# Patient Record
Sex: MALE | Race: WHITE
[De-identification: names, ages, dates, MRNs, and addresses within clinical notes are randomized per-mention and may not be internally consistent; named-entity substitution may affect disease eponyms.]

---

## 2018-11-15 ENCOUNTER — HOSPITAL ENCOUNTER (EMERGENCY)
Dept: HOSPITAL 26 - MED | Age: 53
Discharge: HOME | End: 2018-11-15
Payer: COMMERCIAL

## 2018-11-15 VITALS — DIASTOLIC BLOOD PRESSURE: 70 MMHG | SYSTOLIC BLOOD PRESSURE: 120 MMHG

## 2018-11-15 VITALS
BODY MASS INDEX: 29.12 KG/M2 | DIASTOLIC BLOOD PRESSURE: 82 MMHG | WEIGHT: 215 LBS | SYSTOLIC BLOOD PRESSURE: 115 MMHG | HEIGHT: 72 IN

## 2018-11-15 DIAGNOSIS — F41.9: ICD-10-CM
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
## 2018-11-15 DIAGNOSIS — S22.42XA: Primary | ICD-10-CM
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
## 2018-11-15 DIAGNOSIS — I10: ICD-10-CM

## 2018-11-15 LAB
ALBUMIN FLD-MCNC: 3.7 G/DL (ref 3.4–5)
ANION GAP SERPL CALCULATED.3IONS-SCNC: 18.9 MMOL/L (ref 8–16)
AST SERPL-CCNC: 287 U/L (ref 15–37)
BASOPHILS # BLD AUTO: 0.1 K/UL (ref 0–0.22)
BASOPHILS NFR BLD AUTO: 2.2 % (ref 0–2)
BILIRUB SERPL-MCNC: 0.7 MG/DL (ref 0–1)
BUN SERPL-MCNC: 7 MG/DL (ref 7–18)
CHLORIDE SERPL-SCNC: 105 MMOL/L (ref 98–107)
CO2 SERPL-SCNC: 21 MMOL/L (ref 21–32)
CREAT SERPL-MCNC: 1 MG/DL (ref 0.7–1.3)
EOSINOPHIL # BLD AUTO: 0.1 K/UL (ref 0–0.4)
EOSINOPHIL NFR BLD AUTO: 1.1 % (ref 0–4)
ERYTHROCYTE [DISTWIDTH] IN BLOOD BY AUTOMATED COUNT: 17.9 % (ref 11.6–13.7)
GFR SERPL CREATININE-BSD FRML MDRD: 101 ML/MIN (ref 90–?)
GLUCOSE SERPL-MCNC: 109 MG/DL (ref 74–106)
HCT VFR BLD AUTO: 47.3 % (ref 36–52)
HGB BLD-MCNC: 16.1 G/DL (ref 12–18)
LYMPHOCYTES # BLD AUTO: 1.5 K/UL (ref 2–11.5)
LYMPHOCYTES NFR BLD AUTO: 29.3 % (ref 20.5–51.1)
MCH RBC QN AUTO: 31 PG (ref 27–31)
MCHC RBC AUTO-ENTMCNC: 34 G/DL (ref 33–37)
MCV RBC AUTO: 91.7 FL (ref 80–94)
MONOCYTES # BLD AUTO: 0.8 K/UL (ref 0.8–1)
MONOCYTES NFR BLD AUTO: 15.8 % (ref 1.7–9.3)
NEUTROPHILS # BLD AUTO: 2.6 K/UL (ref 1.8–7.7)
NEUTROPHILS NFR BLD AUTO: 51.6 % (ref 42.2–75.2)
PLATELET # BLD AUTO: 233 K/UL (ref 140–450)
POTASSIUM SERPL-SCNC: 3.9 MMOL/L (ref 3.5–5.1)
PROTHROMBIN TIME: 10.1 SECS (ref 10.8–13.4)
RBC # BLD AUTO: 5.16 MIL/UL (ref 4.2–6.1)
SODIUM SERPL-SCNC: 141 MMOL/L (ref 136–145)
WBC # BLD AUTO: 5 K/UL (ref 4.8–10.8)

## 2018-11-15 PROCEDURE — 70450 CT HEAD/BRAIN W/O DYE: CPT

## 2018-11-15 PROCEDURE — 80053 COMPREHEN METABOLIC PANEL: CPT

## 2018-11-15 PROCEDURE — 84484 ASSAY OF TROPONIN QUANT: CPT

## 2018-11-15 PROCEDURE — 96361 HYDRATE IV INFUSION ADD-ON: CPT

## 2018-11-15 PROCEDURE — 85610 PROTHROMBIN TIME: CPT

## 2018-11-15 PROCEDURE — 85025 COMPLETE CBC W/AUTO DIFF WBC: CPT

## 2018-11-15 PROCEDURE — 99285 EMERGENCY DEPT VISIT HI MDM: CPT

## 2018-11-15 PROCEDURE — 85730 THROMBOPLASTIN TIME PARTIAL: CPT

## 2018-11-15 PROCEDURE — 96360 HYDRATION IV INFUSION INIT: CPT

## 2018-11-15 PROCEDURE — 71045 X-RAY EXAM CHEST 1 VIEW: CPT

## 2018-11-15 PROCEDURE — 36415 COLL VENOUS BLD VENIPUNCTURE: CPT

## 2019-10-22 ENCOUNTER — HOSPITAL ENCOUNTER (EMERGENCY)
Dept: HOSPITAL 26 - MED | Age: 54
Discharge: NURSING, REHAB OR LONG-TERM CARE | End: 2019-10-22
Payer: MEDICAID

## 2019-10-22 VITALS — DIASTOLIC BLOOD PRESSURE: 72 MMHG | SYSTOLIC BLOOD PRESSURE: 111 MMHG

## 2019-10-22 VITALS — DIASTOLIC BLOOD PRESSURE: 69 MMHG | SYSTOLIC BLOOD PRESSURE: 110 MMHG

## 2019-10-22 VITALS — HEIGHT: 72 IN | BODY MASS INDEX: 42.66 KG/M2 | WEIGHT: 315 LBS

## 2019-10-22 DIAGNOSIS — E11.22: ICD-10-CM

## 2019-10-22 DIAGNOSIS — Z79.899: ICD-10-CM

## 2019-10-22 DIAGNOSIS — K74.60: Primary | ICD-10-CM

## 2019-10-22 DIAGNOSIS — N18.6: ICD-10-CM

## 2019-10-22 DIAGNOSIS — I12.0: ICD-10-CM

## 2019-10-22 DIAGNOSIS — Z99.2: ICD-10-CM

## 2019-10-22 DIAGNOSIS — R18.8: ICD-10-CM

## 2019-10-22 DIAGNOSIS — Z88.8: ICD-10-CM

## 2019-10-22 DIAGNOSIS — D64.9: ICD-10-CM

## 2019-10-22 DIAGNOSIS — Z98.890: ICD-10-CM

## 2019-10-22 DIAGNOSIS — Z79.4: ICD-10-CM

## 2019-10-22 LAB
ALBUMIN FLD-MCNC: 2.3 G/DL (ref 3.4–5)
ANION GAP SERPL CALCULATED.3IONS-SCNC: 14 MMOL/L (ref 8–16)
AST SERPL-CCNC: 54 U/L (ref 15–37)
BASOPHILS # BLD AUTO: 0.1 K/UL (ref 0–0.22)
BASOPHILS NFR BLD AUTO: 1 % (ref 0–2)
BILIRUB SERPL-MCNC: 1.3 MG/DL (ref 0–1)
BUN SERPL-MCNC: 36 MG/DL (ref 7–18)
CHLORIDE SERPL-SCNC: 100 MMOL/L (ref 98–107)
CO2 SERPL-SCNC: 25 MMOL/L (ref 21–32)
CREAT SERPL-MCNC: 3.7 MG/DL (ref 0.7–1.3)
EOSINOPHIL # BLD AUTO: 0 K/UL (ref 0–0.4)
EOSINOPHIL NFR BLD AUTO: 0.3 % (ref 0–4)
ERYTHROCYTE [DISTWIDTH] IN BLOOD BY AUTOMATED COUNT: 17.4 % (ref 11.6–13.7)
GFR SERPL CREATININE-BSD FRML MDRD: 22 ML/MIN (ref 90–?)
GLUCOSE SERPL-MCNC: 202 MG/DL (ref 74–106)
HCT VFR BLD AUTO: 31.7 % (ref 36–52)
HGB BLD-MCNC: 10.2 G/DL (ref 12–18)
LYMPHOCYTES # BLD AUTO: 2.4 K/UL (ref 2–11.5)
LYMPHOCYTES NFR BLD AUTO: 19.7 % (ref 20.5–51.1)
MCH RBC QN AUTO: 30 PG (ref 27–31)
MCHC RBC AUTO-ENTMCNC: 32 G/DL (ref 33–37)
MCV RBC AUTO: 94.1 FL (ref 80–94)
MONOCYTES # BLD AUTO: 0.7 K/UL (ref 0.8–1)
MONOCYTES NFR BLD AUTO: 5.9 % (ref 1.7–9.3)
NEUTROPHILS # BLD AUTO: 8.9 K/UL (ref 1.8–7.7)
NEUTROPHILS NFR BLD AUTO: 73.1 % (ref 42.2–75.2)
PLATELET # BLD AUTO: 99 K/UL (ref 140–450)
POTASSIUM SERPL-SCNC: 3 MMOL/L (ref 3.5–5.1)
PROTHROMBIN TIME: 13.3 SECS (ref 10.8–13.4)
RBC # BLD AUTO: 3.36 MIL/UL (ref 4.2–6.1)
SODIUM SERPL-SCNC: 136 MMOL/L (ref 136–145)
WBC # BLD AUTO: 12.1 K/UL (ref 4.8–10.8)

## 2019-10-22 PROCEDURE — 76705 ECHO EXAM OF ABDOMEN: CPT

## 2019-10-22 PROCEDURE — 80053 COMPREHEN METABOLIC PANEL: CPT

## 2019-10-22 PROCEDURE — 93005 ELECTROCARDIOGRAM TRACING: CPT

## 2019-10-22 PROCEDURE — 85025 COMPLETE CBC W/AUTO DIFF WBC: CPT

## 2019-10-22 PROCEDURE — 99284 EMERGENCY DEPT VISIT MOD MDM: CPT

## 2019-10-22 PROCEDURE — 71045 X-RAY EXAM CHEST 1 VIEW: CPT

## 2019-10-22 PROCEDURE — 85610 PROTHROMBIN TIME: CPT

## 2019-10-22 PROCEDURE — 83880 ASSAY OF NATRIURETIC PEPTIDE: CPT

## 2019-10-22 PROCEDURE — 85730 THROMBOPLASTIN TIME PARTIAL: CPT

## 2019-10-22 PROCEDURE — 84484 ASSAY OF TROPONIN QUANT: CPT

## 2019-10-22 PROCEDURE — 36415 COLL VENOUS BLD VENIPUNCTURE: CPT

## 2019-10-22 NOTE — NUR
called [facility] [phone], report given to Grecia zuniga. notified pt will transfer 
back to [facility] IN 30-40 MINUTES.

## 2019-10-22 NOTE — NUR
BROUGHT IN BY EMS FROM [facility] EXTENDED CARE

PT'S PCP REQUESTED PT TO BE BROUGHT TO ER FOR PARACENTESIS

COMPLETED DIALYSIS YESTERDAY 3-4 HRS ( SHUNT TO RIGHT UPPER CHEST; M W F)



PEDAL EDEMA, INCREASED ABD GIRTH---FULL CLEAR SPEECH; NO COUGH AT THIS TIME



HX---ESRD (HEMODIALYSIS), CIRRHOSIS, THYROID, DM, . DENIES N/V/D; SKIN IS 
PINK/WARM/DRY; AAOX4 .LUNGS CLEAR BL; HR EVEN AND REGULAR; PT DENIES ANY FEVER, 
CP, SOB, OR COUGH AT THIS TIME; PATIENT STATES PAIN OF 0/10 AT THIS TIME;  
PATIENT POSITIONED FOR COMFORT; HOB ELEVATED; BEDRAILS UP X2; BED DOWN. ER MD 
MADE AWARE OF PT STATUS.

## 2019-12-18 ENCOUNTER — HOSPITAL ENCOUNTER (INPATIENT)
Dept: HOSPITAL 26 - MED | Age: 54
LOS: 6 days | Discharge: NURSING, REHAB OR LONG-TERM CARE | DRG: 720 | End: 2019-12-24
Attending: GENERAL PRACTICE | Admitting: GENERAL PRACTICE
Payer: MEDICAID

## 2019-12-18 VITALS — BODY MASS INDEX: 36.57 KG/M2 | WEIGHT: 270 LBS | HEIGHT: 72 IN

## 2019-12-18 VITALS — SYSTOLIC BLOOD PRESSURE: 73 MMHG | DIASTOLIC BLOOD PRESSURE: 35 MMHG

## 2019-12-18 DIAGNOSIS — N18.6: ICD-10-CM

## 2019-12-18 DIAGNOSIS — Z91.19: ICD-10-CM

## 2019-12-18 DIAGNOSIS — N17.0: ICD-10-CM

## 2019-12-18 DIAGNOSIS — D68.9: ICD-10-CM

## 2019-12-18 DIAGNOSIS — D69.6: ICD-10-CM

## 2019-12-18 DIAGNOSIS — A41.9: Primary | ICD-10-CM

## 2019-12-18 DIAGNOSIS — K70.31: ICD-10-CM

## 2019-12-18 DIAGNOSIS — Z88.8: ICD-10-CM

## 2019-12-18 DIAGNOSIS — F31.9: ICD-10-CM

## 2019-12-18 DIAGNOSIS — N20.0: ICD-10-CM

## 2019-12-18 DIAGNOSIS — N39.0: ICD-10-CM

## 2019-12-18 DIAGNOSIS — Z99.2: ICD-10-CM

## 2019-12-18 DIAGNOSIS — E83.42: ICD-10-CM

## 2019-12-18 DIAGNOSIS — J96.20: ICD-10-CM

## 2019-12-18 DIAGNOSIS — I48.0: ICD-10-CM

## 2019-12-18 DIAGNOSIS — E03.9: ICD-10-CM

## 2019-12-18 DIAGNOSIS — D63.8: ICD-10-CM

## 2019-12-18 DIAGNOSIS — K72.90: ICD-10-CM

## 2019-12-18 DIAGNOSIS — D64.9: ICD-10-CM

## 2019-12-18 DIAGNOSIS — D69.59: ICD-10-CM

## 2019-12-18 DIAGNOSIS — K52.9: ICD-10-CM

## 2019-12-18 DIAGNOSIS — E87.6: ICD-10-CM

## 2019-12-18 DIAGNOSIS — E43: ICD-10-CM

## 2019-12-18 DIAGNOSIS — E83.39: ICD-10-CM

## 2019-12-18 DIAGNOSIS — E11.22: ICD-10-CM

## 2019-12-18 DIAGNOSIS — R65.21: ICD-10-CM

## 2019-12-18 DIAGNOSIS — E66.9: ICD-10-CM

## 2019-12-18 DIAGNOSIS — F10.10: ICD-10-CM

## 2019-12-18 LAB
ALBUMIN FLD-MCNC: 2 G/DL (ref 3.4–5)
ANION GAP SERPL CALCULATED.3IONS-SCNC: 14.6 MMOL/L (ref 8–16)
AST SERPL-CCNC: 72 U/L (ref 15–37)
BASOPHILS # BLD AUTO: 0.6 K/UL (ref 0–0.22)
BASOPHILS NFR BLD AUTO: 3.5 % (ref 0–2)
BILIRUB SERPL-MCNC: 2.9 MG/DL (ref 0–1)
BUN SERPL-MCNC: 9 MG/DL (ref 7–18)
CHLORIDE SERPL-SCNC: 102 MMOL/L (ref 98–107)
CO2 SERPL-SCNC: 25.2 MMOL/L (ref 21–32)
CREAT SERPL-MCNC: 3.3 MG/DL (ref 0.7–1.3)
EOSINOPHIL # BLD AUTO: 0.3 K/UL (ref 0–0.4)
EOSINOPHIL NFR BLD AUTO: 1.5 % (ref 0–4)
ERYTHROCYTE [DISTWIDTH] IN BLOOD BY AUTOMATED COUNT: 19.5 % (ref 11.6–13.7)
GFR SERPL CREATININE-BSD FRML MDRD: 25 ML/MIN (ref 90–?)
GLUCOSE SERPL-MCNC: 88 MG/DL (ref 74–106)
HCT VFR BLD AUTO: 33.9 % (ref 36–52)
HGB BLD-MCNC: 10.7 G/DL (ref 12–18)
LYMPHOCYTES # BLD AUTO: 2 K/UL (ref 2–11.5)
LYMPHOCYTES NFR BLD AUTO: 11.8 % (ref 20.5–51.1)
MCH RBC QN AUTO: 29 PG (ref 27–31)
MCHC RBC AUTO-ENTMCNC: 32 G/DL (ref 33–37)
MCV RBC AUTO: 91.2 FL (ref 80–94)
MONOCYTES # BLD AUTO: 2.1 K/UL (ref 0.8–1)
MONOCYTES NFR BLD AUTO: 12.7 % (ref 1.7–9.3)
NEUTROPHILS # BLD AUTO: 11.9 K/UL (ref 1.8–7.7)
NEUTROPHILS NFR BLD AUTO: 70.5 % (ref 42.2–75.2)
PLATELET # BLD AUTO: 77 K/UL (ref 140–450)
POTASSIUM SERPL-SCNC: 3.8 MMOL/L (ref 3.5–5.1)
PROTHROMBIN TIME: 21.3 SECS (ref 10.8–13.4)
RBC # BLD AUTO: 3.72 MIL/UL (ref 4.2–6.1)
SODIUM SERPL-SCNC: 138 MMOL/L (ref 136–145)
WBC # BLD AUTO: 16.9 K/UL (ref 4.8–10.8)

## 2019-12-18 PROCEDURE — P9046 ALBUMIN (HUMAN), 25%, 20 ML: HCPCS

## 2019-12-18 NOTE — NUR
PER DR CALABRESE, VERBAL ORDER TO OPEN WIDE OPEN 1L NS OUT OF ORDERED 
MAINTENANCE FLUIDS (3.5L AT 125ML/HR), ORDER CARRIED OUT.

## 2019-12-18 NOTE — NUR
54 YEAR OLD MALE BIBA COMPLAINS OF ABDOMINAL PAIN 6/10 THAT IS ACHING AND 
STABBING FOR THE PAST WEEK. ABDOMEN BOWEL SOUNDS ACTIVE X4, TENDER ON 
PALPATION. PATIENT STATES DIALYSIS 5X WEEK AND UNABLE TO URINATE. PATIENT 
STATES THAT AT END OF DIALYSIS AT 1700 THEY SPED UP THE TREATMENT AND HE HAD 
SHORTNESS OF BREATHE AND CHEST PAIN. SHORTNESS OF BREATHE AND CHEST PAIN DENIED 
CURRENTLY. , BP 73/35. PATIENT ALERT AND ORIENTED, BREATHING EVEN AND 
NONLABORED, SKIN COOL AND DRY. BED IN LOWEST POSITION, LOCKED, BED RAIL UPX1.

## 2019-12-18 NOTE — NUR
PT ATTEMPTED TO COLLECT URINE, UNABLE TO AT THIS TIME. PT REFUSING TO HAVE 
URINE CATHETER PLACED AT THIS TIME, DR CALABRESE MADE AWARE.

## 2019-12-19 VITALS — SYSTOLIC BLOOD PRESSURE: 101 MMHG | DIASTOLIC BLOOD PRESSURE: 43 MMHG

## 2019-12-19 VITALS — DIASTOLIC BLOOD PRESSURE: 57 MMHG | SYSTOLIC BLOOD PRESSURE: 90 MMHG

## 2019-12-19 VITALS — SYSTOLIC BLOOD PRESSURE: 87 MMHG | DIASTOLIC BLOOD PRESSURE: 46 MMHG

## 2019-12-19 VITALS — SYSTOLIC BLOOD PRESSURE: 98 MMHG | DIASTOLIC BLOOD PRESSURE: 55 MMHG

## 2019-12-19 VITALS — DIASTOLIC BLOOD PRESSURE: 59 MMHG | SYSTOLIC BLOOD PRESSURE: 90 MMHG

## 2019-12-19 VITALS — SYSTOLIC BLOOD PRESSURE: 99 MMHG | DIASTOLIC BLOOD PRESSURE: 63 MMHG

## 2019-12-19 VITALS — SYSTOLIC BLOOD PRESSURE: 98 MMHG | DIASTOLIC BLOOD PRESSURE: 63 MMHG

## 2019-12-19 VITALS — DIASTOLIC BLOOD PRESSURE: 59 MMHG | SYSTOLIC BLOOD PRESSURE: 96 MMHG

## 2019-12-19 VITALS — DIASTOLIC BLOOD PRESSURE: 48 MMHG | SYSTOLIC BLOOD PRESSURE: 104 MMHG

## 2019-12-19 VITALS — SYSTOLIC BLOOD PRESSURE: 92 MMHG | DIASTOLIC BLOOD PRESSURE: 45 MMHG

## 2019-12-19 VITALS — DIASTOLIC BLOOD PRESSURE: 50 MMHG | SYSTOLIC BLOOD PRESSURE: 101 MMHG

## 2019-12-19 VITALS — DIASTOLIC BLOOD PRESSURE: 60 MMHG | SYSTOLIC BLOOD PRESSURE: 114 MMHG

## 2019-12-19 VITALS — DIASTOLIC BLOOD PRESSURE: 52 MMHG | SYSTOLIC BLOOD PRESSURE: 93 MMHG

## 2019-12-19 VITALS — DIASTOLIC BLOOD PRESSURE: 51 MMHG | SYSTOLIC BLOOD PRESSURE: 87 MMHG

## 2019-12-19 VITALS — SYSTOLIC BLOOD PRESSURE: 90 MMHG | DIASTOLIC BLOOD PRESSURE: 61 MMHG

## 2019-12-19 VITALS — SYSTOLIC BLOOD PRESSURE: 101 MMHG | DIASTOLIC BLOOD PRESSURE: 66 MMHG

## 2019-12-19 VITALS — DIASTOLIC BLOOD PRESSURE: 52 MMHG | SYSTOLIC BLOOD PRESSURE: 91 MMHG

## 2019-12-19 VITALS — SYSTOLIC BLOOD PRESSURE: 98 MMHG | DIASTOLIC BLOOD PRESSURE: 53 MMHG

## 2019-12-19 VITALS — SYSTOLIC BLOOD PRESSURE: 88 MMHG | DIASTOLIC BLOOD PRESSURE: 52 MMHG

## 2019-12-19 VITALS — SYSTOLIC BLOOD PRESSURE: 90 MMHG | DIASTOLIC BLOOD PRESSURE: 60 MMHG

## 2019-12-19 VITALS — SYSTOLIC BLOOD PRESSURE: 91 MMHG | DIASTOLIC BLOOD PRESSURE: 51 MMHG

## 2019-12-19 VITALS — DIASTOLIC BLOOD PRESSURE: 41 MMHG | SYSTOLIC BLOOD PRESSURE: 95 MMHG

## 2019-12-19 VITALS — SYSTOLIC BLOOD PRESSURE: 99 MMHG | DIASTOLIC BLOOD PRESSURE: 64 MMHG

## 2019-12-19 VITALS — DIASTOLIC BLOOD PRESSURE: 66 MMHG | SYSTOLIC BLOOD PRESSURE: 92 MMHG

## 2019-12-19 VITALS — SYSTOLIC BLOOD PRESSURE: 104 MMHG | DIASTOLIC BLOOD PRESSURE: 63 MMHG

## 2019-12-19 VITALS — DIASTOLIC BLOOD PRESSURE: 57 MMHG | SYSTOLIC BLOOD PRESSURE: 98 MMHG

## 2019-12-19 VITALS — DIASTOLIC BLOOD PRESSURE: 61 MMHG | SYSTOLIC BLOOD PRESSURE: 92 MMHG

## 2019-12-19 VITALS — DIASTOLIC BLOOD PRESSURE: 46 MMHG | SYSTOLIC BLOOD PRESSURE: 87 MMHG

## 2019-12-19 VITALS — DIASTOLIC BLOOD PRESSURE: 63 MMHG | SYSTOLIC BLOOD PRESSURE: 98 MMHG

## 2019-12-19 VITALS — SYSTOLIC BLOOD PRESSURE: 93 MMHG | DIASTOLIC BLOOD PRESSURE: 52 MMHG

## 2019-12-19 VITALS — SYSTOLIC BLOOD PRESSURE: 110 MMHG | DIASTOLIC BLOOD PRESSURE: 74 MMHG

## 2019-12-19 VITALS — DIASTOLIC BLOOD PRESSURE: 56 MMHG | SYSTOLIC BLOOD PRESSURE: 100 MMHG

## 2019-12-19 VITALS — DIASTOLIC BLOOD PRESSURE: 74 MMHG | SYSTOLIC BLOOD PRESSURE: 102 MMHG

## 2019-12-19 VITALS — DIASTOLIC BLOOD PRESSURE: 56 MMHG | SYSTOLIC BLOOD PRESSURE: 87 MMHG

## 2019-12-19 VITALS — DIASTOLIC BLOOD PRESSURE: 55 MMHG | SYSTOLIC BLOOD PRESSURE: 90 MMHG

## 2019-12-19 VITALS — SYSTOLIC BLOOD PRESSURE: 100 MMHG | DIASTOLIC BLOOD PRESSURE: 65 MMHG

## 2019-12-19 VITALS — SYSTOLIC BLOOD PRESSURE: 97 MMHG | DIASTOLIC BLOOD PRESSURE: 58 MMHG

## 2019-12-19 VITALS — DIASTOLIC BLOOD PRESSURE: 52 MMHG | SYSTOLIC BLOOD PRESSURE: 100 MMHG

## 2019-12-19 VITALS — DIASTOLIC BLOOD PRESSURE: 60 MMHG | SYSTOLIC BLOOD PRESSURE: 97 MMHG

## 2019-12-19 VITALS — SYSTOLIC BLOOD PRESSURE: 102 MMHG | DIASTOLIC BLOOD PRESSURE: 60 MMHG

## 2019-12-19 VITALS — DIASTOLIC BLOOD PRESSURE: 59 MMHG | SYSTOLIC BLOOD PRESSURE: 83 MMHG

## 2019-12-19 VITALS — DIASTOLIC BLOOD PRESSURE: 51 MMHG | SYSTOLIC BLOOD PRESSURE: 92 MMHG

## 2019-12-19 VITALS — DIASTOLIC BLOOD PRESSURE: 61 MMHG | SYSTOLIC BLOOD PRESSURE: 90 MMHG

## 2019-12-19 VITALS — DIASTOLIC BLOOD PRESSURE: 45 MMHG | SYSTOLIC BLOOD PRESSURE: 92 MMHG

## 2019-12-19 VITALS — SYSTOLIC BLOOD PRESSURE: 89 MMHG | DIASTOLIC BLOOD PRESSURE: 56 MMHG

## 2019-12-19 VITALS — SYSTOLIC BLOOD PRESSURE: 86 MMHG | DIASTOLIC BLOOD PRESSURE: 55 MMHG

## 2019-12-19 VITALS — SYSTOLIC BLOOD PRESSURE: 111 MMHG | DIASTOLIC BLOOD PRESSURE: 60 MMHG

## 2019-12-19 VITALS — SYSTOLIC BLOOD PRESSURE: 78 MMHG | DIASTOLIC BLOOD PRESSURE: 48 MMHG

## 2019-12-19 VITALS — SYSTOLIC BLOOD PRESSURE: 91 MMHG | DIASTOLIC BLOOD PRESSURE: 52 MMHG

## 2019-12-19 VITALS — SYSTOLIC BLOOD PRESSURE: 85 MMHG | DIASTOLIC BLOOD PRESSURE: 51 MMHG

## 2019-12-19 VITALS — DIASTOLIC BLOOD PRESSURE: 52 MMHG | SYSTOLIC BLOOD PRESSURE: 96 MMHG

## 2019-12-19 VITALS — SYSTOLIC BLOOD PRESSURE: 111 MMHG | DIASTOLIC BLOOD PRESSURE: 69 MMHG

## 2019-12-19 VITALS — SYSTOLIC BLOOD PRESSURE: 98 MMHG | DIASTOLIC BLOOD PRESSURE: 59 MMHG

## 2019-12-19 VITALS — SYSTOLIC BLOOD PRESSURE: 111 MMHG | DIASTOLIC BLOOD PRESSURE: 54 MMHG

## 2019-12-19 VITALS — DIASTOLIC BLOOD PRESSURE: 58 MMHG | SYSTOLIC BLOOD PRESSURE: 97 MMHG

## 2019-12-19 VITALS — SYSTOLIC BLOOD PRESSURE: 120 MMHG | DIASTOLIC BLOOD PRESSURE: 25 MMHG

## 2019-12-19 VITALS — DIASTOLIC BLOOD PRESSURE: 58 MMHG | SYSTOLIC BLOOD PRESSURE: 95 MMHG

## 2019-12-19 VITALS — SYSTOLIC BLOOD PRESSURE: 81 MMHG | DIASTOLIC BLOOD PRESSURE: 55 MMHG

## 2019-12-19 VITALS — DIASTOLIC BLOOD PRESSURE: 60 MMHG | SYSTOLIC BLOOD PRESSURE: 90 MMHG

## 2019-12-19 VITALS — SYSTOLIC BLOOD PRESSURE: 108 MMHG | DIASTOLIC BLOOD PRESSURE: 60 MMHG

## 2019-12-19 VITALS — DIASTOLIC BLOOD PRESSURE: 33 MMHG | SYSTOLIC BLOOD PRESSURE: 92 MMHG

## 2019-12-19 VITALS — DIASTOLIC BLOOD PRESSURE: 65 MMHG | SYSTOLIC BLOOD PRESSURE: 111 MMHG

## 2019-12-19 VITALS — DIASTOLIC BLOOD PRESSURE: 56 MMHG | SYSTOLIC BLOOD PRESSURE: 88 MMHG

## 2019-12-19 VITALS — DIASTOLIC BLOOD PRESSURE: 47 MMHG | SYSTOLIC BLOOD PRESSURE: 90 MMHG

## 2019-12-19 VITALS — SYSTOLIC BLOOD PRESSURE: 112 MMHG | DIASTOLIC BLOOD PRESSURE: 45 MMHG

## 2019-12-19 LAB
AMYLASE SERPL-CCNC: 43 U/L (ref 25–115)
ANION GAP SERPL CALCULATED.3IONS-SCNC: 15.9 MMOL/L (ref 8–16)
BASOPHILS # BLD AUTO: 0.1 K/UL (ref 0–0.22)
BASOPHILS NFR BLD AUTO: 0.9 % (ref 0–2)
BUN SERPL-MCNC: 11 MG/DL (ref 7–18)
CHLORIDE SERPL-SCNC: 103 MMOL/L (ref 98–107)
CHOLEST/HDLC SERPL: 6.9 {RATIO} (ref 1–4.5)
CO2 SERPL-SCNC: 23.9 MMOL/L (ref 21–32)
CREAT SERPL-MCNC: 3.4 MG/DL (ref 0.7–1.3)
EOSINOPHIL # BLD AUTO: 0.1 K/UL (ref 0–0.4)
EOSINOPHIL NFR BLD AUTO: 0.4 % (ref 0–4)
ERYTHROCYTE [DISTWIDTH] IN BLOOD BY AUTOMATED COUNT: 19.4 % (ref 11.6–13.7)
GFR SERPL CREATININE-BSD FRML MDRD: 24 ML/MIN (ref 90–?)
GLUCOSE SERPL-MCNC: 115 MG/DL (ref 74–106)
HCT VFR BLD AUTO: 28.7 % (ref 36–52)
HDLC SERPL-MCNC: 16 MG/DL (ref 40–60)
HGB BLD-MCNC: 9 G/DL (ref 12–18)
LDLC SERPL CALC-MCNC: 83 MG/DL (ref 60–100)
LYMPHOCYTES # BLD AUTO: 1.4 K/UL (ref 2–11.5)
LYMPHOCYTES NFR BLD AUTO: 9 % (ref 20.5–51.1)
MAGNESIUM SERPL-MCNC: 1.5 MG/DL (ref 1.8–2.4)
MAGNESIUM SERPL-MCNC: 1.7 MG/DL (ref 1.8–2.4)
MCH RBC QN AUTO: 29 PG (ref 27–31)
MCHC RBC AUTO-ENTMCNC: 32 G/DL (ref 33–37)
MCV RBC AUTO: 91.9 FL (ref 80–94)
MONOCYTES # BLD AUTO: 1 K/UL (ref 0.8–1)
MONOCYTES NFR BLD AUTO: 6.3 % (ref 1.7–9.3)
NEUTROPHILS # BLD AUTO: 13.2 K/UL (ref 1.8–7.7)
NEUTROPHILS NFR BLD AUTO: 83.4 % (ref 42.2–75.2)
PHOSPHATE SERPL-MCNC: 2.4 MG/DL (ref 2.5–4.9)
PHOSPHATE SERPL-MCNC: 2.8 MG/DL (ref 2.5–4.9)
PLATELET # BLD AUTO: 82 K/UL (ref 140–450)
POTASSIUM SERPL-SCNC: 3.8 MMOL/L (ref 3.5–5.1)
RBC # BLD AUTO: 3.12 MIL/UL (ref 4.2–6.1)
SODIUM SERPL-SCNC: 139 MMOL/L (ref 136–145)
TRIGL SERPL-MCNC: 60 MG/DL (ref 30–150)
TSH SERPL DL<=0.05 MIU/L-ACNC: 4.2 UIU/ML (ref 0.34–3.74)
WBC # BLD AUTO: 15.8 K/UL (ref 4.8–10.8)

## 2019-12-19 PROCEDURE — B548ZZA ULTRASONOGRAPHY OF SUPERIOR VENA CAVA, GUIDANCE: ICD-10-PCS | Performed by: GENERAL PRACTICE

## 2019-12-19 PROCEDURE — 02HV33Z INSERTION OF INFUSION DEVICE INTO SUPERIOR VENA CAVA, PERCUTANEOUS APPROACH: ICD-10-PCS | Performed by: GENERAL PRACTICE

## 2019-12-19 RX ADMIN — SODIUM CHLORIDE PRN MLS/HR: 0.9 INJECTION, SOLUTION INTRAVENOUS at 21:45

## 2019-12-19 RX ADMIN — Medication SCH DEV: at 20:50

## 2019-12-19 RX ADMIN — Medication SCH DEV: at 08:18

## 2019-12-19 RX ADMIN — INSULIN GLARGINE SCH UNITS: 100 INJECTION, SOLUTION SUBCUTANEOUS at 21:00

## 2019-12-19 RX ADMIN — Medication SCH DEV: at 17:21

## 2019-12-19 RX ADMIN — HYDROCORTISONE SODIUM SUCCINATE SCH MG: 100 INJECTION, POWDER, FOR SOLUTION INTRAMUSCULAR; INTRAVENOUS at 12:30

## 2019-12-19 RX ADMIN — Medication SCH EACH: at 08:40

## 2019-12-19 RX ADMIN — DEXTROSE SCH MLS/HR: 50 INJECTION, SOLUTION INTRAVENOUS at 12:11

## 2019-12-19 RX ADMIN — DEXTROSE SCH MLS/HR: 5 SOLUTION INTRAVENOUS at 12:09

## 2019-12-19 RX ADMIN — Medication SCH TAB: at 08:39

## 2019-12-19 RX ADMIN — METRONIDAZOLE SCH MLS/HR: 500 SOLUTION INTRAVENOUS at 12:31

## 2019-12-19 RX ADMIN — SODIUM CHLORIDE SCH MLS/HR: 9 INJECTION, SOLUTION INTRAVENOUS at 01:30

## 2019-12-19 RX ADMIN — SODIUM CHLORIDE PRN MLS/HR: 0.9 INJECTION, SOLUTION INTRAVENOUS at 14:32

## 2019-12-19 RX ADMIN — LEVOTHYROXINE SODIUM SCH MG: 50 TABLET ORAL at 07:41

## 2019-12-19 RX ADMIN — INSULIN LISPRO PRN UNITS: 100 INJECTION, SOLUTION INTRAVENOUS; SUBCUTANEOUS at 21:32

## 2019-12-19 RX ADMIN — HYDROCORTISONE SODIUM SUCCINATE SCH MG: 100 INJECTION, POWDER, FOR SOLUTION INTRAMUSCULAR; INTRAVENOUS at 20:49

## 2019-12-19 RX ADMIN — NICARDIPINE HYDROCHLORIDE PRN MLS/HR: 25 INJECTION INTRAVENOUS at 12:12

## 2019-12-19 RX ADMIN — MIDODRINE HYDROCHLORIDE SCH MG: 5 TABLET ORAL at 18:56

## 2019-12-19 RX ADMIN — HYDROCODONE BITARTRATE AND ACETAMINOPHEN PRN TAB: 5; 325 TABLET ORAL at 21:28

## 2019-12-19 RX ADMIN — DEXTROSE SCH MLS/HR: 50 INJECTION, SOLUTION INTRAVENOUS at 17:25

## 2019-12-19 RX ADMIN — NICARDIPINE HYDROCHLORIDE PRN MLS/HR: 25 INJECTION INTRAVENOUS at 10:38

## 2019-12-19 RX ADMIN — Medication SCH DEV: at 11:30

## 2019-12-19 RX ADMIN — METRONIDAZOLE SCH MLS/HR: 500 SOLUTION INTRAVENOUS at 20:50

## 2019-12-19 RX ADMIN — INSULIN LISPRO PRN UNITS: 100 INJECTION, SOLUTION INTRAVENOUS; SUBCUTANEOUS at 17:22

## 2019-12-19 RX ADMIN — MULTIVITAMIN TABLET SCH TAB: TABLET at 08:40

## 2019-12-19 RX ADMIN — SODIUM CHLORIDE SCH MLS/HR: 9 INJECTION, SOLUTION INTRAVENOUS at 09:21

## 2019-12-19 NOTE — NUR
12/19/19 RD INITIAL ASSESSMENT COMPLETED



PLEASE REFER TO NUTRITION ASSESSMENT UNDER CARE ACTIVITY FOR ESTIMATED NUTRITIONAL NEEDS. 



1. CONTINUE RENAL, CCHO 60GM AND 2GM NA DIET AS TOLERATED 

2. RECOMMEND NEPRO BID

3. ENCOURAGED PT TO INCREASE PO INTAKE

4. RD TO FOLLOW-UP 3-5 DAYS, MODERATE RISK 



NIURKA CRAMER, RD

## 2019-12-19 NOTE — NUR
1L NS BOLUS FINISHED FROM MAINTENANCE FLUIDS, BP REMAINS LOW, SEE VITAL SIGNS 
RECORD, DR CALABRESE MADE AWARE, ORDERS PENDING.

## 2019-12-19 NOTE — NUR
Bariatric Surgery MD AT BEDSIDE, AHMED AND [place] GIVING RISKS FOR CENTRAL LINE AND PICC LINE. EXPLAINED 
SITUATION. EXPLAINED REASONING. ANSWERED QUESTIONS. PATIENT STATES HE AGREES TO PICC LINE. 
WITNESSED AND PATIENT SIGNED BY SELF THE CONSENT. AAOX4. KNOWS NAME, TIME,SITUATION, 
PRESIDENT, LOCATION, HISTORY. WILL CONTINUE TO MONITOR.

## 2019-12-19 NOTE — NUR
PATIENT OFFERED TO BRUSH TEETH, REFUSED, STATES HE WILL BRUSH HIS TEETH LATER AFTER SPONGE 
BATH. ASSISTED PATIENT WITH TURNING AND POSITIONING. ADJUSTED HEAD FOR PROPER BODY 
ALIGNMENT. PATIENT ABLE TO MOVE EXTREMITIES BUT NOT COMPLETELY INDEPENDENT, GENERALIZED 
WEAKNESS NOTED. ON NEOSYNEPHRINE DRIP.

## 2019-12-19 NOTE — NUR
DR Mann  AND RESIDENT NOTIFIED BLOOD DRAW NOT DONE BECAUSE PT WAS A HARD STICK AND HAD ONLY 
ONE LINE LEFT HAND 24 GAUGE FOR NEOSYNEPHRINE.CHARGE RN WILL CALL PICC LINE RN TO 
[facility].ALSO MAG RIDER WAS NOT GIVEN DUE TO ONLY ONE LINE BY NIGHT SHIFT. PER MD MELENDEZ TO GIVE 
LATER.

-------------------------------------------------------------------------------

Addendum: 12/19/19 at 1622 by Ruthann Arita RN RN

-------------------------------------------------------------------------------

RESIDENT NAME IS DR KING.

## 2019-12-19 NOTE — NUR
PATIENTS BLOOD SUGAR . SCHEDULED MEDS ARE LANTUS 30 UNITS AND COVERAGE FOR BLOOD SUGAR 
IS HUMALOG 2 UNITS AS PER SLIDING SCALE. PER RESIDENT MD, OK TO HOLD LANTUS BUT OK TO GIVE 
HUMALOG. WILL CARRY OUT.

## 2019-12-19 NOTE — NUR
PATIENT ADMITTED TO ICU FROM ER VIA GURNEY. PATIENT ALERT AND ORIENTED X4. ABLE TO MAKE 
NEEDS KNOWN. OBEYS COMMANDS. CAME IN WITH GLASSES ON FACE AND VIA NASAL CANNULA 3L. PATIENT 
MOVED TO ICU BED WITHOUT INJURY. PATIENT CAME IN WITH LEFT AC IV SITE AND LEFT HAND IV SITE. 
RUNNING NEOSYNEPHRINE 50 MCG AND NORMAL SALINE 126 ML/HR. PATIENT HAS BLACK LAPTOP AND CELL 
PHONE IN BEDSIDE STORAGE. CAME IN WITH 2 PROPERTY BAGS. CAME IN WITH PURPLE BLANKET. ALL 
PLACED IN STORAGE. ER EMT BROUGHT A GRAY "SNUGGIE" BLANKET BACK TO PATIENT AS WELL FROM ER. 
PER MO ER NURSE, DR. CALABRESE WANTED A 3,500 NS IVF GIVEN AT RATE OF 126ML/HR. WILL FOLLOW 
UP WITH MDS AND ORDERS. STATES STOMACH HAS BEEN TENDER FOR MONTH WHEN PUSHED ON. BUT NO PAIN 
NOTED. REFUSED MARTINEZ CATHETER IN ICU AND ER. MDS AWARE. PATIENT STATES ONLY PEES EVERY 3-4 
DAYS D/T DIALYSIS. DIALYSIS ACCESS ON RIGHT UPPER CHEST. NO SWELLING NOTED. DRY AND INTACT 
DRESSING. PATIENT HAS INTACT SKIN AT THIS TIME. STRONG HR HEARD. SWELLING IN BOTH LOWER 
EXTREMITIES. ABLE TO MOVE ALL EXTREMITIES. PERRLA BILATERAL. PATIENT WAS PLACED ON ROOM AIR 
BY ER NURSE MO. TOLERATING WELL. RESPIRATION SYMMETRICAL BILATERALLY AND UNLABORED. BED IN 
LOWEST POSITION CALL LIGHT BY BED. SAFETY MEASURES IN PLACE. AFEBRILE. WILL CONTINUE TO DO 
ASSESSMENT. PATIENT ABLE TO MOVE UP POSITION IN BED. BOWEL SOUNDS ACTIVE IN ALL 4 QUADS. 
PATIENT PLACED ON RENAL DIET AT THIS TIME. NON SKID SOCKS IN PLACE. PATIENT SPEAKING WITH MD 
AT THIS TIME.

## 2019-12-19 NOTE — NUR
DRESSING OF MIDLINE CATHETER IS MODERATELY SATURATED WITH BLOOD.DR DEMPSEY BEEN AWARE AND PER 
MD REINFORCE DRESSING.PICC LINE RN STATED BE CAREFUL IF WILL CHANGE DRESSING DUE TO HIGH 
RISK OF BEING PULLED PER PICC LINE rn 3 CM IS OUT AND 7 CM LONG.

## 2019-12-19 NOTE — NUR
PER LAB, MATEO, UNABLE TO RETRIEVE BLOOD AT THIS TIME. STATES WAS HAVING DIFFICULT TIME  
DRAWING ON SAME PATIENT IN ER AS WELL. WILL CONTINUE TO MONITOR AT THIS TIME

## 2019-12-19 NOTE — NUR
DR COOK , DR CARRERA, AND DR NAYLOR AT BEDSIDE. UPDATED ON PATIENT STATUS

-------------------------------------------------------------------------------

Addendum: 12/19/19 at 0822 by Betty Davis RN

-------------------------------------------------------------------------------

STATED INFECTION POSSIBLY "IN BELLY" OR IN "DIALYSIS ACCESS" WAITING ON CULTURES. ALSO 
STATED "SMALL STONE IN LEFT KIDNEY STONE THAT IS NOT OBSTRUCTING PER MD COOK" PATIENT 
NOTIFIED JOYCE AND RESIDENTS THAT "ONLY PEES EVERY 3-4 DAYS " NOTIFIED HER THAT DIALYSIS 
M/W/F DONE YESTERDAY AND FINISHED.

## 2019-12-19 NOTE — NUR
PATIENT ALERT AND ORIENTED, BREATHING EVEN AND NONLABORED, SKIN COOL AND DRY. 
WILL CONTINUE TO MONITOR.

## 2019-12-19 NOTE — NUR
DISCHARGE PLANNIN Y/O MALE PATIENT FROM [facility], WHO CAME IN DUE TO ABDOMINAL PAIN X 1 DAY.  PAST MEDICAL 
HISTORY INCLUDE ESRD ON HD, DM, ALCOHOLIC CIRRHOSIS AND HYPOTHYROIDISM.  INITIAL DIAGNOSIS 
OF SEPSIS.  CURRENT LABS INCLUDE WBC 15.8, H/H 9.0/28.7, NA/K 139/3.8, BUN/CREA 11/3.4 AND 
MAG 1.5.  LACTIC ACID 1.5.  ON VANCOMYCIN, ZOSYN, METRONIDAZOLE.  CARDIO, CRITICAL CARE, 
NEPHRO AND PULMO CONSULTS IN PLACE.  ANTICIPATED DC PLAN IS TO GO BACK TO [facility] ONCE STABLE.

-------------------------------------------------------------------------------

Addendum: 19 at 1654 by Nanette Bedolla CM

-------------------------------------------------------------------------------

RECEIVED AN ORDER TO GO BACK TO [facility].



CONTACTED YUMIKO WARREN -[phone], SHE STATED TO GO AHEAD AND CALL CALL A CAR AT 
[phone] OPT 4 OPT 1.   CONTACTED THE PROVIDED NUMBER ABLE TO SPEAK TO KAJAL, SHE STATED 
THEY WILL NEED AN AUTH FROM THE INSURANCE BECAUSE IT IS OUT OF AREA.  CONTACTED YUMIKO WARREN 
AGAIN REGARDING AUTH SHE STATED SHE WILL CALL ME BACK WITH THE AUTH.

## 2019-12-19 NOTE — NUR
RECEIVED PATIENT ON BED.LEFT AC IV 20 GAUGE WAS LEAKING PER NIGHT SHIFT RN SO REMOVED IT.PT 
AGREED IT WAS LEAKING.PT TOLERATED WELL.NO BLEEDING NOTED.LEFT HAND 24 GAUGE -NEOSYNEPHRINE 
ONGOING.RESIDENT AWARE.NO SIGNS OF INFILTRATION.AWAITING FOR PICC LINE RN.CHARGE RN WILL 
CALL TO [facility]. IT.PT REFUSED MARTINEZ TO BE INSERTED.PT REFUSED SCD.EDUCATION WAS GIVEN BUT 
PT STILL REFUSED.PT ALSO REFUSED ANOTHER IV TO BE INSERTED AS PT VERBALIZED WAS STUCK SO 
MANY TIMES.EDUCATED PT ANOTHER IV IS NEEDED BUT PT STILL REFUSED.

-------------------------------------------------------------------------------

Addendum: 12/19/19 at 2030 by Ruthann Arita RN RN

-------------------------------------------------------------------------------

PT WITH BRUISES ALL OVER THE BODY ESPECIALLY BILATERAL ARMS AND UPPER CHEST.

## 2019-12-19 NOTE — NUR
DR NAYLOR AT BEDSIDE. AWARE OF LEFT AC IV INFILTRATION. ICE BAG PLACED IN WASHCLOTH ON 
INFILTRATED SITE PER CHARGE NURSE. NEW IV PLACEMENT ATTEMPTED. NO SUCCESS. PICC CONSENT 
ALREADY IN PLACE FOR TODAY. MDS AWARE. PATIENT HAS LEFT HAND IV STILL PRESENT WITH GOOD 
BLOOD RETURN. FLUSHED AND PATENT AT THIS TIME. PLACED ETHAN SYNEPHRINE 50 MCG ON LEFT HAND 
SITE PER MD ORDERS. RUNNING WITHOUT INJURY AT THIS TIME. WILL CONTINUE TO CLOSELY MONITOR. 
PATIENT DENIES PAIN AT BOTH IV SITES. NO SOB NOTED. NO S/S OF DISTRESS NOTED. PATIENT 
CONTINUES TO REST IN BED AND APPEARS PEACEFULLY. STATES HE WILL NOTIFY HIS BROTHER VIA CELL 
PHONE AT BEDSIDE OF HIS ADMIT IN ICU. BED IN LOWEST POSITION. CALL LIGHT WITHIN REACH

## 2019-12-19 NOTE — NUR
RECEIVED REPORT FROM DAYSHIFT NURSE AT PATIENTS BEDSIDE. PATIENT AWAKE AND ALERT,ANOx4, 
MAKES NEEDS KNOWN AND FOLLOWS SIMPLE COMMANDS. PATIENT ON ROOM AIR AND SATURATING AT93%. 
LUNG SOUNDS CLEAR, WITH VISIBLE SYMMETRICAL CHEST RISE. NO SIGNS OF CONGESTION/COUGH OR SOB. 
S1S2, SR ON MONITOR. PATIENT ON NEOSYNEPHRINE @ 50 MCG-75ML/HR. BLOOD PRESSURE-97/60. SKIN 
WARM AND DRY, AFEBRILE, SKIN IS INTACT BUT THERE ARE VISIBLE BRUISES TO UPPER EXTREMITIES. 
PATIENT REPORTS HAVING SEVERAL ATTEMPTS FROM NURSES TO START IV AND UNSUCCESSFUL AND PRONE 
TO BRUISES FROM "BEING A HARD STICK". LEFT HAND PERIPHERAL IV, 24G, FLUSHED AND INTACT 
WITHOUT SYMPTOMS. LEFT UPPER ARM MIDLINE DOUBLE LUMEN IN PLACE, FLUSHED, PATENT, WITHOUT 
SYMPTOMS. KERLIX ROLL IN PLACE TO PREVENT FROM DISPLACEMENT. PATIENT SCHEDULED FOR PICC LINE 
TODAY BUT PICC LINE NURSE ONLY ABLE TO START MIDLINE. RIGHT SUBCLAVIAN IS A ARIANA 
CATHETER, PATIENT HAS HEMODIALYSIS MWF. ABDOMEN IS LARGE, ROUND AND SLIGHTLY TENDER TO TOUCH 
AT LOWER QUADRANTS. PATIENT COMPLAINS OF TENDERNESS UPON PALPATION. VISIBLE BRUISING TO 
ABDOMEN. PT REPORTS HAVING RECENT PARACENTESIS. BOWEL SOUNDS ACTIVE. PATIENT IS CONTINENT 
WITH URINAL AT BEDSIDE. REPORTS NOT BEING ABLE TO URINATE BUT WILL TRY LATER TO GET URINE 
SAMPLE. BED IS LOCKED AND IN LOW POSITION, SIDERAILS UPx3, CALL LIGHT WITHIN REACH, AND 
PATIENT UPDATED ON CARE PLAN. WILL CONTINUE TO MONITOR.

## 2019-12-19 NOTE — NUR
RESIDENT CYNDY MELENDEZ WITH PHENYLEPHRINE MEDICATION BEING TITRATED IN CURRENT 20G 
LEFT AC IV, STATES HE DOES NOT WANT TO WAIT FOR CENTRAL LINE

## 2019-12-19 NOTE — NUR
RETURNED FROM LUNCH BREAK. IV PUMPS PAUSED AT THIS TIME. AS STATED BY LAB MATEO, WHILE 
PRESENT IN ROOM ATTEMPTING BLOOD DRAW, LEFT AC IV WAS INFILTRATED. NICK NURSE CHECKING SITE. 
FLAGYL HANGING ALONG WITH NS IVF AND NORSYNEPHRINE PER MD ORDERS. VITAL SIGNS CONTINUE TO 
REMAIN STABLE AT THIS TIME. PATIENT DENIES PAIN AT THIS TIME. WILL CHECK OTHER IV SITE.

## 2019-12-19 NOTE — NUR
SPOKE WITH CHARGE NURSE AND DR. NAYLOR ON IV MEDICATION ADMINISTRATION. D/T  ONE PATENT IV 
SITE AT THIS TIME. USING ONLY NORSYNEPHRINE IN LEFT HAND AT THIS TIME. PER CHARGE NURSE, WE 
DO NOT WANT TO CHANCE LOSING IV SITE. DR. NAYLOR STATES WILL CHANGE TIMES D/T BLOOD PRESSURE 
STABILITY BEING THE MOST IMPORTANT AT THIS TIME. WILL ENDORSE TO DAY SHIFT. WILL CONTINUE TO 
CLOSELY MONITOR.  

-------------------------------------------------------------------------------

Addendum: 12/19/19 at 0825 by Betty Davis RN

-------------------------------------------------------------------------------

MD NAYLOR AWARE OF NOT HANGING IV FLUIDS D/T NO SUCCESS WITH IV SITES AT THIS TIME. MD STATES 
WILL CHANGE TIMES FOR MEDICATIONS UNTIL F/U WITH PICC NURSE. MD AWARE OF NON ADMINISTRATION. 
WILL ENDORSE TO DAY SHIFT

## 2019-12-19 NOTE — NUR
PATIENT HAS BEEN SCREENED AND CATEGORIZED AS HIGH NUTRITION RISK. PATIENT WILL BE SEEN 
WITHIN 1-2 DAYS OF ADMISSION.



12/19/19-12/20/19



NIURKA CRAMER RD

## 2019-12-19 NOTE — NUR
Patient will be admitted to care of DR MCCABE. Admited to ICU.  Will go to room 
8. Belongings list completed.  Report to MAIKEL LUGO.

## 2019-12-19 NOTE — NUR
PATIENT CONTINUES TO REFUSE SCD's DESPITE RN ENCOURAGING NEED. PATIENT PREFERS USING RANGE 
OF MOTION EXERCISES. RN AT BEDSIDE TO ASSIST WITH LOWER EXTREMITY ACTIVE RANGE OF MOTION 
EXERCISES. NO INCIDENT NOTED.

## 2019-12-20 VITALS — SYSTOLIC BLOOD PRESSURE: 92 MMHG | DIASTOLIC BLOOD PRESSURE: 55 MMHG

## 2019-12-20 VITALS — DIASTOLIC BLOOD PRESSURE: 55 MMHG | SYSTOLIC BLOOD PRESSURE: 96 MMHG

## 2019-12-20 VITALS — SYSTOLIC BLOOD PRESSURE: 95 MMHG | DIASTOLIC BLOOD PRESSURE: 51 MMHG

## 2019-12-20 VITALS — SYSTOLIC BLOOD PRESSURE: 81 MMHG | DIASTOLIC BLOOD PRESSURE: 55 MMHG

## 2019-12-20 VITALS — SYSTOLIC BLOOD PRESSURE: 98 MMHG | DIASTOLIC BLOOD PRESSURE: 60 MMHG

## 2019-12-20 VITALS — SYSTOLIC BLOOD PRESSURE: 94 MMHG | DIASTOLIC BLOOD PRESSURE: 58 MMHG

## 2019-12-20 VITALS — SYSTOLIC BLOOD PRESSURE: 96 MMHG | DIASTOLIC BLOOD PRESSURE: 54 MMHG

## 2019-12-20 VITALS — DIASTOLIC BLOOD PRESSURE: 54 MMHG | SYSTOLIC BLOOD PRESSURE: 96 MMHG

## 2019-12-20 VITALS — DIASTOLIC BLOOD PRESSURE: 52 MMHG | SYSTOLIC BLOOD PRESSURE: 94 MMHG

## 2019-12-20 VITALS — DIASTOLIC BLOOD PRESSURE: 49 MMHG | SYSTOLIC BLOOD PRESSURE: 92 MMHG

## 2019-12-20 VITALS — DIASTOLIC BLOOD PRESSURE: 62 MMHG | SYSTOLIC BLOOD PRESSURE: 112 MMHG

## 2019-12-20 VITALS — SYSTOLIC BLOOD PRESSURE: 93 MMHG | DIASTOLIC BLOOD PRESSURE: 51 MMHG

## 2019-12-20 VITALS — DIASTOLIC BLOOD PRESSURE: 55 MMHG | SYSTOLIC BLOOD PRESSURE: 86 MMHG

## 2019-12-20 VITALS — SYSTOLIC BLOOD PRESSURE: 84 MMHG | DIASTOLIC BLOOD PRESSURE: 63 MMHG

## 2019-12-20 VITALS — SYSTOLIC BLOOD PRESSURE: 90 MMHG | DIASTOLIC BLOOD PRESSURE: 55 MMHG

## 2019-12-20 VITALS — SYSTOLIC BLOOD PRESSURE: 94 MMHG | DIASTOLIC BLOOD PRESSURE: 59 MMHG

## 2019-12-20 VITALS — SYSTOLIC BLOOD PRESSURE: 101 MMHG | DIASTOLIC BLOOD PRESSURE: 64 MMHG

## 2019-12-20 VITALS — DIASTOLIC BLOOD PRESSURE: 64 MMHG | SYSTOLIC BLOOD PRESSURE: 98 MMHG

## 2019-12-20 VITALS — DIASTOLIC BLOOD PRESSURE: 60 MMHG | SYSTOLIC BLOOD PRESSURE: 98 MMHG

## 2019-12-20 VITALS — SYSTOLIC BLOOD PRESSURE: 82 MMHG | DIASTOLIC BLOOD PRESSURE: 45 MMHG

## 2019-12-20 VITALS — DIASTOLIC BLOOD PRESSURE: 50 MMHG | SYSTOLIC BLOOD PRESSURE: 99 MMHG

## 2019-12-20 VITALS — DIASTOLIC BLOOD PRESSURE: 38 MMHG | SYSTOLIC BLOOD PRESSURE: 79 MMHG

## 2019-12-20 VITALS — DIASTOLIC BLOOD PRESSURE: 61 MMHG | SYSTOLIC BLOOD PRESSURE: 96 MMHG

## 2019-12-20 VITALS — SYSTOLIC BLOOD PRESSURE: 98 MMHG | DIASTOLIC BLOOD PRESSURE: 74 MMHG

## 2019-12-20 VITALS — DIASTOLIC BLOOD PRESSURE: 55 MMHG | SYSTOLIC BLOOD PRESSURE: 98 MMHG

## 2019-12-20 VITALS — SYSTOLIC BLOOD PRESSURE: 91 MMHG | DIASTOLIC BLOOD PRESSURE: 62 MMHG

## 2019-12-20 VITALS — DIASTOLIC BLOOD PRESSURE: 55 MMHG | SYSTOLIC BLOOD PRESSURE: 89 MMHG

## 2019-12-20 VITALS — SYSTOLIC BLOOD PRESSURE: 91 MMHG | DIASTOLIC BLOOD PRESSURE: 55 MMHG

## 2019-12-20 VITALS — DIASTOLIC BLOOD PRESSURE: 58 MMHG | SYSTOLIC BLOOD PRESSURE: 94 MMHG

## 2019-12-20 VITALS — SYSTOLIC BLOOD PRESSURE: 91 MMHG | DIASTOLIC BLOOD PRESSURE: 57 MMHG

## 2019-12-20 VITALS — SYSTOLIC BLOOD PRESSURE: 99 MMHG | DIASTOLIC BLOOD PRESSURE: 53 MMHG

## 2019-12-20 VITALS — SYSTOLIC BLOOD PRESSURE: 86 MMHG | DIASTOLIC BLOOD PRESSURE: 55 MMHG

## 2019-12-20 VITALS — DIASTOLIC BLOOD PRESSURE: 55 MMHG | SYSTOLIC BLOOD PRESSURE: 97 MMHG

## 2019-12-20 VITALS — DIASTOLIC BLOOD PRESSURE: 52 MMHG | SYSTOLIC BLOOD PRESSURE: 95 MMHG

## 2019-12-20 VITALS — SYSTOLIC BLOOD PRESSURE: 90 MMHG | DIASTOLIC BLOOD PRESSURE: 57 MMHG

## 2019-12-20 VITALS — DIASTOLIC BLOOD PRESSURE: 51 MMHG | SYSTOLIC BLOOD PRESSURE: 88 MMHG

## 2019-12-20 VITALS — DIASTOLIC BLOOD PRESSURE: 63 MMHG | SYSTOLIC BLOOD PRESSURE: 93 MMHG

## 2019-12-20 VITALS — SYSTOLIC BLOOD PRESSURE: 87 MMHG | DIASTOLIC BLOOD PRESSURE: 57 MMHG

## 2019-12-20 VITALS — DIASTOLIC BLOOD PRESSURE: 64 MMHG | SYSTOLIC BLOOD PRESSURE: 101 MMHG

## 2019-12-20 VITALS — DIASTOLIC BLOOD PRESSURE: 54 MMHG | SYSTOLIC BLOOD PRESSURE: 87 MMHG

## 2019-12-20 VITALS — DIASTOLIC BLOOD PRESSURE: 63 MMHG | SYSTOLIC BLOOD PRESSURE: 99 MMHG

## 2019-12-20 VITALS — DIASTOLIC BLOOD PRESSURE: 51 MMHG | SYSTOLIC BLOOD PRESSURE: 81 MMHG

## 2019-12-20 VITALS — SYSTOLIC BLOOD PRESSURE: 88 MMHG | DIASTOLIC BLOOD PRESSURE: 59 MMHG

## 2019-12-20 VITALS — SYSTOLIC BLOOD PRESSURE: 100 MMHG | DIASTOLIC BLOOD PRESSURE: 73 MMHG

## 2019-12-20 VITALS — DIASTOLIC BLOOD PRESSURE: 52 MMHG | SYSTOLIC BLOOD PRESSURE: 85 MMHG

## 2019-12-20 VITALS — DIASTOLIC BLOOD PRESSURE: 68 MMHG | SYSTOLIC BLOOD PRESSURE: 93 MMHG

## 2019-12-20 VITALS — DIASTOLIC BLOOD PRESSURE: 50 MMHG | SYSTOLIC BLOOD PRESSURE: 113 MMHG

## 2019-12-20 VITALS — SYSTOLIC BLOOD PRESSURE: 106 MMHG | DIASTOLIC BLOOD PRESSURE: 55 MMHG

## 2019-12-20 VITALS — SYSTOLIC BLOOD PRESSURE: 99 MMHG | DIASTOLIC BLOOD PRESSURE: 63 MMHG

## 2019-12-20 VITALS — DIASTOLIC BLOOD PRESSURE: 62 MMHG | SYSTOLIC BLOOD PRESSURE: 95 MMHG

## 2019-12-20 VITALS — DIASTOLIC BLOOD PRESSURE: 45 MMHG | SYSTOLIC BLOOD PRESSURE: 86 MMHG

## 2019-12-20 VITALS — DIASTOLIC BLOOD PRESSURE: 60 MMHG | SYSTOLIC BLOOD PRESSURE: 104 MMHG

## 2019-12-20 VITALS — DIASTOLIC BLOOD PRESSURE: 62 MMHG | SYSTOLIC BLOOD PRESSURE: 99 MMHG

## 2019-12-20 VITALS — SYSTOLIC BLOOD PRESSURE: 84 MMHG | DIASTOLIC BLOOD PRESSURE: 55 MMHG

## 2019-12-20 VITALS — SYSTOLIC BLOOD PRESSURE: 98 MMHG | DIASTOLIC BLOOD PRESSURE: 65 MMHG

## 2019-12-20 VITALS — SYSTOLIC BLOOD PRESSURE: 97 MMHG | DIASTOLIC BLOOD PRESSURE: 59 MMHG

## 2019-12-20 VITALS — DIASTOLIC BLOOD PRESSURE: 55 MMHG | SYSTOLIC BLOOD PRESSURE: 116 MMHG

## 2019-12-20 VITALS — DIASTOLIC BLOOD PRESSURE: 62 MMHG | SYSTOLIC BLOOD PRESSURE: 97 MMHG

## 2019-12-20 VITALS — DIASTOLIC BLOOD PRESSURE: 59 MMHG | SYSTOLIC BLOOD PRESSURE: 89 MMHG

## 2019-12-20 VITALS — SYSTOLIC BLOOD PRESSURE: 97 MMHG | DIASTOLIC BLOOD PRESSURE: 57 MMHG

## 2019-12-20 VITALS — DIASTOLIC BLOOD PRESSURE: 56 MMHG | SYSTOLIC BLOOD PRESSURE: 92 MMHG

## 2019-12-20 VITALS — SYSTOLIC BLOOD PRESSURE: 102 MMHG | DIASTOLIC BLOOD PRESSURE: 56 MMHG

## 2019-12-20 VITALS — DIASTOLIC BLOOD PRESSURE: 62 MMHG | SYSTOLIC BLOOD PRESSURE: 87 MMHG

## 2019-12-20 VITALS — SYSTOLIC BLOOD PRESSURE: 90 MMHG | DIASTOLIC BLOOD PRESSURE: 58 MMHG

## 2019-12-20 VITALS — SYSTOLIC BLOOD PRESSURE: 92 MMHG | DIASTOLIC BLOOD PRESSURE: 57 MMHG

## 2019-12-20 VITALS — DIASTOLIC BLOOD PRESSURE: 64 MMHG | SYSTOLIC BLOOD PRESSURE: 103 MMHG

## 2019-12-20 VITALS — SYSTOLIC BLOOD PRESSURE: 100 MMHG | DIASTOLIC BLOOD PRESSURE: 57 MMHG

## 2019-12-20 VITALS — SYSTOLIC BLOOD PRESSURE: 106 MMHG | DIASTOLIC BLOOD PRESSURE: 42 MMHG

## 2019-12-20 VITALS — DIASTOLIC BLOOD PRESSURE: 65 MMHG | SYSTOLIC BLOOD PRESSURE: 104 MMHG

## 2019-12-20 VITALS — SYSTOLIC BLOOD PRESSURE: 86 MMHG | DIASTOLIC BLOOD PRESSURE: 64 MMHG

## 2019-12-20 VITALS — DIASTOLIC BLOOD PRESSURE: 62 MMHG | SYSTOLIC BLOOD PRESSURE: 101 MMHG

## 2019-12-20 VITALS — SYSTOLIC BLOOD PRESSURE: 111 MMHG | DIASTOLIC BLOOD PRESSURE: 65 MMHG

## 2019-12-20 VITALS — SYSTOLIC BLOOD PRESSURE: 85 MMHG | DIASTOLIC BLOOD PRESSURE: 57 MMHG

## 2019-12-20 VITALS — SYSTOLIC BLOOD PRESSURE: 94 MMHG | DIASTOLIC BLOOD PRESSURE: 51 MMHG

## 2019-12-20 VITALS — SYSTOLIC BLOOD PRESSURE: 104 MMHG | DIASTOLIC BLOOD PRESSURE: 58 MMHG

## 2019-12-20 VITALS — DIASTOLIC BLOOD PRESSURE: 56 MMHG | SYSTOLIC BLOOD PRESSURE: 95 MMHG

## 2019-12-20 VITALS — SYSTOLIC BLOOD PRESSURE: 106 MMHG | DIASTOLIC BLOOD PRESSURE: 65 MMHG

## 2019-12-20 VITALS — DIASTOLIC BLOOD PRESSURE: 58 MMHG | SYSTOLIC BLOOD PRESSURE: 90 MMHG

## 2019-12-20 VITALS — DIASTOLIC BLOOD PRESSURE: 74 MMHG | SYSTOLIC BLOOD PRESSURE: 115 MMHG

## 2019-12-20 VITALS — SYSTOLIC BLOOD PRESSURE: 112 MMHG | DIASTOLIC BLOOD PRESSURE: 65 MMHG

## 2019-12-20 VITALS — SYSTOLIC BLOOD PRESSURE: 87 MMHG | DIASTOLIC BLOOD PRESSURE: 52 MMHG

## 2019-12-20 VITALS — DIASTOLIC BLOOD PRESSURE: 53 MMHG | SYSTOLIC BLOOD PRESSURE: 102 MMHG

## 2019-12-20 VITALS — DIASTOLIC BLOOD PRESSURE: 56 MMHG | SYSTOLIC BLOOD PRESSURE: 89 MMHG

## 2019-12-20 VITALS — SYSTOLIC BLOOD PRESSURE: 100 MMHG | DIASTOLIC BLOOD PRESSURE: 69 MMHG

## 2019-12-20 LAB
ANION GAP SERPL CALCULATED.3IONS-SCNC: 14.4 MMOL/L (ref 8–16)
APPEARANCE UR: (no result)
BARBITURATES UR QL SCN: (no result) NG/ML
BASOPHILS # BLD AUTO: 0.1 K/UL (ref 0–0.22)
BASOPHILS NFR BLD AUTO: 0.5 % (ref 0–2)
BENZODIAZ UR QL SCN: (no result) NG/ML
BILIRUB UR QL STRIP: NEGATIVE
BUN SERPL-MCNC: 16 MG/DL (ref 7–18)
BZE UR QL SCN: (no result) NG/ML
CANNABINOIDS UR QL SCN: (no result) NG/ML
CHLORIDE SERPL-SCNC: 101 MMOL/L (ref 98–107)
CO2 SERPL-SCNC: 23.7 MMOL/L (ref 21–32)
COLOR UR: YELLOW
CREAT SERPL-MCNC: 4 MG/DL (ref 0.7–1.3)
EOSINOPHIL # BLD AUTO: 0 K/UL (ref 0–0.4)
EOSINOPHIL NFR BLD AUTO: 0.1 % (ref 0–4)
ERYTHROCYTE [DISTWIDTH] IN BLOOD BY AUTOMATED COUNT: 19.5 % (ref 11.6–13.7)
GFR SERPL CREATININE-BSD FRML MDRD: 20 ML/MIN (ref 90–?)
GLUCOSE SERPL-MCNC: 150 MG/DL (ref 74–106)
GLUCOSE UR STRIP-MCNC: NEGATIVE MG/DL
HCT VFR BLD AUTO: 30.1 % (ref 36–52)
HGB BLD-MCNC: 9.6 G/DL (ref 12–18)
HGB UR QL STRIP: (no result)
LEUKOCYTE ESTERASE UR QL STRIP: (no result)
LYMPHOCYTES # BLD AUTO: 1 K/UL (ref 2–11.5)
LYMPHOCYTES NFR BLD AUTO: 8.1 % (ref 20.5–51.1)
MAGNESIUM SERPL-MCNC: 2.2 MG/DL (ref 1.8–2.4)
MCH RBC QN AUTO: 29 PG (ref 27–31)
MCHC RBC AUTO-ENTMCNC: 32 G/DL (ref 33–37)
MCV RBC AUTO: 91.3 FL (ref 80–94)
MONOCYTES # BLD AUTO: 1 K/UL (ref 0.8–1)
MONOCYTES NFR BLD AUTO: 7.9 % (ref 1.7–9.3)
NEUTROPHILS # BLD AUTO: 10.3 K/UL (ref 1.8–7.7)
NEUTROPHILS NFR BLD AUTO: 83.4 % (ref 42.2–75.2)
NITRITE UR QL STRIP: NEGATIVE
OPIATES UR QL SCN: (no result) NG/ML
PCP UR QL SCN: (no result) NG/ML
PH UR STRIP: 8 [PH] (ref 5–9)
PHOSPHATE SERPL-MCNC: 3.8 MG/DL (ref 2.5–4.9)
PLATELET # BLD AUTO: 112 K/UL (ref 140–450)
POTASSIUM SERPL-SCNC: 4.1 MMOL/L (ref 3.5–5.1)
PROTHROMBIN TIME: 24.4 SECS (ref 10.8–13.4)
PROTHROMBIN TIME: 24.7 SECS (ref 10.8–13.4)
RBC # BLD AUTO: 3.3 MIL/UL (ref 4.2–6.1)
RBC #/AREA URNS HPF: (no result) /HPF (ref 0–5)
SODIUM SERPL-SCNC: 135 MMOL/L (ref 136–145)
WBC # BLD AUTO: 12.4 K/UL (ref 4.8–10.8)
WBC,URINE: (no result) /HPF (ref 0–5)

## 2019-12-20 PROCEDURE — 5A1D70Z PERFORMANCE OF URINARY FILTRATION, INTERMITTENT, LESS THAN 6 HOURS PER DAY: ICD-10-PCS

## 2019-12-20 RX ADMIN — SODIUM CHLORIDE PRN MLS/HR: 0.9 INJECTION, SOLUTION INTRAVENOUS at 12:10

## 2019-12-20 RX ADMIN — DEXTROSE SCH MLS/HR: 50 INJECTION, SOLUTION INTRAVENOUS at 23:53

## 2019-12-20 RX ADMIN — METRONIDAZOLE SCH MLS/HR: 500 SOLUTION INTRAVENOUS at 04:28

## 2019-12-20 RX ADMIN — METRONIDAZOLE SCH MLS/HR: 500 SOLUTION INTRAVENOUS at 16:51

## 2019-12-20 RX ADMIN — Medication SCH DEV: at 20:48

## 2019-12-20 RX ADMIN — HYDROCORTISONE SODIUM SUCCINATE SCH MG: 100 INJECTION, POWDER, FOR SOLUTION INTRAMUSCULAR; INTRAVENOUS at 12:15

## 2019-12-20 RX ADMIN — Medication SCH DEV: at 07:24

## 2019-12-20 RX ADMIN — INSULIN LISPRO PRN UNITS: 100 INJECTION, SOLUTION INTRAVENOUS; SUBCUTANEOUS at 11:43

## 2019-12-20 RX ADMIN — Medication SCH EACH: at 08:35

## 2019-12-20 RX ADMIN — METRONIDAZOLE SCH MLS/HR: 500 SOLUTION INTRAVENOUS at 12:07

## 2019-12-20 RX ADMIN — SODIUM CHLORIDE PRN MLS/HR: 0.9 INJECTION, SOLUTION INTRAVENOUS at 14:47

## 2019-12-20 RX ADMIN — Medication SCH DEV: at 16:01

## 2019-12-20 RX ADMIN — DEXTROSE SCH MLS/HR: 50 INJECTION, SOLUTION INTRAVENOUS at 00:18

## 2019-12-20 RX ADMIN — Medication SCH DEV: at 11:42

## 2019-12-20 RX ADMIN — INSULIN LISPRO PRN UNITS: 100 INJECTION, SOLUTION INTRAVENOUS; SUBCUTANEOUS at 21:00

## 2019-12-20 RX ADMIN — DEXTROSE SCH MLS/HR: 50 INJECTION, SOLUTION INTRAVENOUS at 18:21

## 2019-12-20 RX ADMIN — MIDODRINE HYDROCHLORIDE SCH MG: 5 TABLET ORAL at 18:20

## 2019-12-20 RX ADMIN — DEXTROSE SCH MLS/HR: 50 INJECTION, SOLUTION INTRAVENOUS at 06:15

## 2019-12-20 RX ADMIN — DEXTROSE SCH MLS/HR: 50 INJECTION, SOLUTION INTRAVENOUS at 12:00

## 2019-12-20 RX ADMIN — MIDODRINE HYDROCHLORIDE SCH MG: 5 TABLET ORAL at 12:16

## 2019-12-20 RX ADMIN — MIDODRINE HYDROCHLORIDE SCH MG: 5 TABLET ORAL at 07:19

## 2019-12-20 RX ADMIN — HYDROCORTISONE SODIUM SUCCINATE SCH MG: 100 INJECTION, POWDER, FOR SOLUTION INTRAMUSCULAR; INTRAVENOUS at 20:42

## 2019-12-20 RX ADMIN — MULTIVITAMIN TABLET SCH TAB: TABLET at 08:35

## 2019-12-20 RX ADMIN — INSULIN GLARGINE SCH UNITS: 100 INJECTION, SOLUTION SUBCUTANEOUS at 21:04

## 2019-12-20 RX ADMIN — HYDROCORTISONE SODIUM SUCCINATE SCH MG: 100 INJECTION, POWDER, FOR SOLUTION INTRAMUSCULAR; INTRAVENOUS at 04:28

## 2019-12-20 RX ADMIN — DEXTROSE SCH MLS/HR: 5 SOLUTION INTRAVENOUS at 10:33

## 2019-12-20 RX ADMIN — SODIUM CHLORIDE PRN MLS/HR: 0.9 INJECTION, SOLUTION INTRAVENOUS at 18:13

## 2019-12-20 RX ADMIN — Medication SCH TAB: at 08:35

## 2019-12-20 RX ADMIN — SODIUM CHLORIDE PRN MLS/HR: 0.9 INJECTION, SOLUTION INTRAVENOUS at 06:14

## 2019-12-20 RX ADMIN — SODIUM CHLORIDE PRN MLS/HR: 0.9 INJECTION, SOLUTION INTRAVENOUS at 09:04

## 2019-12-20 RX ADMIN — SODIUM CHLORIDE PRN MLS/HR: 0.9 INJECTION, SOLUTION INTRAVENOUS at 01:42

## 2019-12-20 RX ADMIN — LEVOTHYROXINE SODIUM SCH MG: 50 TABLET ORAL at 07:19

## 2019-12-20 NOTE — NUR
patient voided 100cc of very cloudy urine withn foul smell specimen sent to lab for 
test.patient also incontinent of stool.

nilda care partial linens changed.

## 2019-12-20 NOTE — NUR
CHART AUDIT COMPLETE WITH DAY SHIFT NURSE, ENDORSED CARE TO DAYSHIFT NURSE AT BEDSIDE. 
PATIENT STABLE, NO SIGNS OF DISTRESS.

## 2019-12-20 NOTE — NUR
PATIENT RESTING WELL, ALERT TO NAME. POLITELY ASKS TO HAVE SPONGE BATH AT LATER TIME, 
PATIENT FINALLY FALLEN ASLEEP, WILL ALLOW FOR SPONGE BATH AND ORAL CARE AT A LATER TIME WHEN 
WELL RESTED. PATIENT ABLE TO INDEPENDENTLY REPOSITION WITH SOME ASSISTANCE. OFFLOADED 
PRESSURE AREAS. NO COMPLAINTS AT THIS TIME.

## 2019-12-20 NOTE — NUR
PATIENTS IV PUMP ALARMING, READJUSTED ARM POSITION TO FIX IV LINE KINK. PATIENT FALLS ASLEEP 
AND TENDS TO MOVE ARMS AND CAUSES KINK. WITH MOVEMENT, PATIENTS EKG RHYTHM ALERTS OF VTACH. 
INACCURATE READING, HEART RATE IS STABLE WITH SR ON MONITOR. ALL LINES FLUSHED AND PATENT 
WITHOUT SYMPTOMS. PATIENT DENIES PAIN, REQUESTS A SMALL AMOUNT OF WATER.

## 2019-12-20 NOTE — NUR
RECEIVED PATIENT ON BED .NO CO PAIN NOR ANY DISCOMFORT.LEFT UPPER ARM MIDLINE WITH OLD BLOOD 
AT THE SITE.NO NEW BLEEDING NOTED.PT STILL NOT URINATING.RIGHT SUBCLAVIAN HD CATHETER I WITH 
DRESSING DRY AND INTACT.PT REFUSED AM CARE RIGHT NOW.LEFT HAND 24 GAUGE PIV NO SIGNS OF 
INFILTRATION.NSR ON THE MONITOR.PER PT SLEPT A LITTLE BIT.ENCOURAGED TO SLEEP.NEOSYNEPHRINE 
DRIP  MCG/MIN.MAP 65.

## 2019-12-20 NOTE — NUR
report and chart audit done to SUSANA lugo

-------------------------------------------------------------------------------

Addendum: 12/20/19 at 2050 by Ruthann Arita RN RN

-------------------------------------------------------------------------------

ENDORSED TO SUSANA LUGO LEFT UPPER ARM MIDLINE WITH OLD BLOOD AT THE DRESSING.RN DID NOT CHANGE  
THE DRESSING AS PER PICC LINE PARISH STATED 3 CM IS OUT AND BE VERY CAREFUL TO CHANGE THE 
DRESSING. PT IS A HARD STICK AND PLAN IS TO WEAN NEOSYNEPHRINE DRIP .PT IS AT 50 MCG/MIN 
RIGHT NOW.NO SIGNS OF INFECTION.NO SWELLING NOTED.

## 2019-12-20 NOTE — NUR
PATIENT BLOOD SUGAR AT HS ,PATIENT RECEIVED HIS REGULAR DOSING OF LANTUS 
SUBCUTANOUSLY.

PATIENT MEAN B/P IS ABOVE 65 WITH ETHAN AT 50 BUT WHEN ATTEMTED TO DECREASE ETHAN TO 25 MCG MEAN 
B/P IS LW LESS THAN 65.SO ETHAN WAS TITRATED BACK TO 50 MCG/

## 2019-12-20 NOTE — NUR
SPOKE WITH PATIENTS BROTHER RAND, TO UPDATE ON PATIENTS CONDITION. PATIENTS BROTHER IS 
VERY ADAMANT ABOUT NOT ALLOWING ANY NURSES/DOCTORS/RESIDENTS TO INQUIRE ABOUT PATIENTS CODE 
STATUS. BROTHER REQUESTS THAT NO ONE IS TO DISCUSS DNR STATUS, HOSPICE, OR PALLIATIVE CARE. 
PATIENTS BROTHER REPEATEDLY STATES PATIENT ALREADY KNOWS HE IS IN END STAGE AND THAT 
DISCUSSING A CHANGE IN TREATMENT (DNR OR COMFORT CARE) IS NOT HEALING OR HELPFUL FOR THE 
PATIENT. PATIENTS BROTHER ALSO ASKS TO FOLLOWUP WITH PRIMARY CARE PROVIDER ABOUT 
ASCITES/PARACENTESIS TREATMENT. MD RESIDENT AT NURSES STATION, MADE AWARE ABOUT BROTHERS 
CONCERNS. WILL ENDORSE TO A.M NURSE.

## 2019-12-21 VITALS — DIASTOLIC BLOOD PRESSURE: 68 MMHG | SYSTOLIC BLOOD PRESSURE: 110 MMHG

## 2019-12-21 VITALS — SYSTOLIC BLOOD PRESSURE: 100 MMHG | DIASTOLIC BLOOD PRESSURE: 58 MMHG

## 2019-12-21 VITALS — SYSTOLIC BLOOD PRESSURE: 112 MMHG | DIASTOLIC BLOOD PRESSURE: 71 MMHG

## 2019-12-21 VITALS — SYSTOLIC BLOOD PRESSURE: 107 MMHG | DIASTOLIC BLOOD PRESSURE: 53 MMHG

## 2019-12-21 VITALS — DIASTOLIC BLOOD PRESSURE: 67 MMHG | SYSTOLIC BLOOD PRESSURE: 102 MMHG

## 2019-12-21 VITALS — DIASTOLIC BLOOD PRESSURE: 49 MMHG | SYSTOLIC BLOOD PRESSURE: 103 MMHG

## 2019-12-21 VITALS — SYSTOLIC BLOOD PRESSURE: 93 MMHG | DIASTOLIC BLOOD PRESSURE: 55 MMHG

## 2019-12-21 VITALS — SYSTOLIC BLOOD PRESSURE: 91 MMHG | DIASTOLIC BLOOD PRESSURE: 66 MMHG

## 2019-12-21 VITALS — SYSTOLIC BLOOD PRESSURE: 103 MMHG | DIASTOLIC BLOOD PRESSURE: 68 MMHG

## 2019-12-21 VITALS — SYSTOLIC BLOOD PRESSURE: 105 MMHG | DIASTOLIC BLOOD PRESSURE: 62 MMHG

## 2019-12-21 VITALS — DIASTOLIC BLOOD PRESSURE: 67 MMHG | SYSTOLIC BLOOD PRESSURE: 108 MMHG

## 2019-12-21 VITALS — SYSTOLIC BLOOD PRESSURE: 95 MMHG | DIASTOLIC BLOOD PRESSURE: 58 MMHG

## 2019-12-21 VITALS — DIASTOLIC BLOOD PRESSURE: 67 MMHG | SYSTOLIC BLOOD PRESSURE: 106 MMHG

## 2019-12-21 VITALS — DIASTOLIC BLOOD PRESSURE: 61 MMHG | SYSTOLIC BLOOD PRESSURE: 104 MMHG

## 2019-12-21 VITALS — DIASTOLIC BLOOD PRESSURE: 69 MMHG | SYSTOLIC BLOOD PRESSURE: 93 MMHG

## 2019-12-21 VITALS — DIASTOLIC BLOOD PRESSURE: 49 MMHG | SYSTOLIC BLOOD PRESSURE: 115 MMHG

## 2019-12-21 VITALS — SYSTOLIC BLOOD PRESSURE: 97 MMHG | DIASTOLIC BLOOD PRESSURE: 59 MMHG

## 2019-12-21 VITALS — SYSTOLIC BLOOD PRESSURE: 97 MMHG | DIASTOLIC BLOOD PRESSURE: 66 MMHG

## 2019-12-21 VITALS — DIASTOLIC BLOOD PRESSURE: 60 MMHG | SYSTOLIC BLOOD PRESSURE: 89 MMHG

## 2019-12-21 VITALS — DIASTOLIC BLOOD PRESSURE: 53 MMHG | SYSTOLIC BLOOD PRESSURE: 100 MMHG

## 2019-12-21 VITALS — SYSTOLIC BLOOD PRESSURE: 98 MMHG | DIASTOLIC BLOOD PRESSURE: 58 MMHG

## 2019-12-21 VITALS — DIASTOLIC BLOOD PRESSURE: 68 MMHG | SYSTOLIC BLOOD PRESSURE: 97 MMHG

## 2019-12-21 VITALS — SYSTOLIC BLOOD PRESSURE: 104 MMHG | DIASTOLIC BLOOD PRESSURE: 61 MMHG

## 2019-12-21 VITALS — SYSTOLIC BLOOD PRESSURE: 86 MMHG | DIASTOLIC BLOOD PRESSURE: 59 MMHG

## 2019-12-21 VITALS — DIASTOLIC BLOOD PRESSURE: 47 MMHG | SYSTOLIC BLOOD PRESSURE: 105 MMHG

## 2019-12-21 VITALS — SYSTOLIC BLOOD PRESSURE: 96 MMHG | DIASTOLIC BLOOD PRESSURE: 58 MMHG

## 2019-12-21 VITALS — SYSTOLIC BLOOD PRESSURE: 118 MMHG | DIASTOLIC BLOOD PRESSURE: 63 MMHG

## 2019-12-21 VITALS — DIASTOLIC BLOOD PRESSURE: 63 MMHG | SYSTOLIC BLOOD PRESSURE: 99 MMHG

## 2019-12-21 VITALS — SYSTOLIC BLOOD PRESSURE: 94 MMHG | DIASTOLIC BLOOD PRESSURE: 53 MMHG

## 2019-12-21 VITALS — DIASTOLIC BLOOD PRESSURE: 58 MMHG | SYSTOLIC BLOOD PRESSURE: 110 MMHG

## 2019-12-21 VITALS — DIASTOLIC BLOOD PRESSURE: 62 MMHG | SYSTOLIC BLOOD PRESSURE: 99 MMHG

## 2019-12-21 VITALS — SYSTOLIC BLOOD PRESSURE: 108 MMHG | DIASTOLIC BLOOD PRESSURE: 53 MMHG

## 2019-12-21 VITALS — DIASTOLIC BLOOD PRESSURE: 56 MMHG | SYSTOLIC BLOOD PRESSURE: 114 MMHG

## 2019-12-21 VITALS — DIASTOLIC BLOOD PRESSURE: 58 MMHG | SYSTOLIC BLOOD PRESSURE: 95 MMHG

## 2019-12-21 VITALS — SYSTOLIC BLOOD PRESSURE: 110 MMHG | DIASTOLIC BLOOD PRESSURE: 55 MMHG

## 2019-12-21 VITALS — DIASTOLIC BLOOD PRESSURE: 67 MMHG | SYSTOLIC BLOOD PRESSURE: 114 MMHG

## 2019-12-21 VITALS — DIASTOLIC BLOOD PRESSURE: 62 MMHG | SYSTOLIC BLOOD PRESSURE: 95 MMHG

## 2019-12-21 VITALS — SYSTOLIC BLOOD PRESSURE: 110 MMHG | DIASTOLIC BLOOD PRESSURE: 75 MMHG

## 2019-12-21 VITALS — SYSTOLIC BLOOD PRESSURE: 88 MMHG | DIASTOLIC BLOOD PRESSURE: 55 MMHG

## 2019-12-21 VITALS — DIASTOLIC BLOOD PRESSURE: 68 MMHG | SYSTOLIC BLOOD PRESSURE: 99 MMHG

## 2019-12-21 VITALS — SYSTOLIC BLOOD PRESSURE: 103 MMHG | DIASTOLIC BLOOD PRESSURE: 64 MMHG

## 2019-12-21 VITALS — DIASTOLIC BLOOD PRESSURE: 58 MMHG | SYSTOLIC BLOOD PRESSURE: 94 MMHG

## 2019-12-21 VITALS — DIASTOLIC BLOOD PRESSURE: 61 MMHG | SYSTOLIC BLOOD PRESSURE: 99 MMHG

## 2019-12-21 VITALS — SYSTOLIC BLOOD PRESSURE: 103 MMHG | DIASTOLIC BLOOD PRESSURE: 40 MMHG

## 2019-12-21 VITALS — DIASTOLIC BLOOD PRESSURE: 59 MMHG | SYSTOLIC BLOOD PRESSURE: 94 MMHG

## 2019-12-21 VITALS — SYSTOLIC BLOOD PRESSURE: 93 MMHG | DIASTOLIC BLOOD PRESSURE: 61 MMHG

## 2019-12-21 VITALS — SYSTOLIC BLOOD PRESSURE: 94 MMHG | DIASTOLIC BLOOD PRESSURE: 63 MMHG

## 2019-12-21 VITALS — SYSTOLIC BLOOD PRESSURE: 99 MMHG | DIASTOLIC BLOOD PRESSURE: 44 MMHG

## 2019-12-21 LAB
ANION GAP SERPL CALCULATED.3IONS-SCNC: 14.3 MMOL/L (ref 8–16)
APPEARANCE FLD: CLEAR
APPEARANCE SPUN FLD: CLEAR
BASOPHILS # BLD AUTO: 0.1 K/UL (ref 0–0.22)
BASOPHILS NFR BLD AUTO: 0.4 % (ref 0–2)
BODY FLD TYPE: (no result)
BUN SERPL-MCNC: 14 MG/DL (ref 7–18)
CHLORIDE SERPL-SCNC: 102 MMOL/L (ref 98–107)
CO2 SERPL-SCNC: 21.8 MMOL/L (ref 21–32)
COLOR FLD: (no result)
CREAT SERPL-MCNC: 3.4 MG/DL (ref 0.7–1.3)
EOSINOPHIL # BLD AUTO: 0 K/UL (ref 0–0.4)
EOSINOPHIL NFR BLD AUTO: 0.1 % (ref 0–4)
ERYTHROCYTE [DISTWIDTH] IN BLOOD BY AUTOMATED COUNT: 19.7 % (ref 11.6–13.7)
GFR SERPL CREATININE-BSD FRML MDRD: 24 ML/MIN (ref 90–?)
GLUCOSE FLD-MCNC: 155 MG/DL
GLUCOSE SERPL-MCNC: 166 MG/DL (ref 74–106)
HCT VFR BLD AUTO: 30 % (ref 36–52)
HGB BLD-MCNC: 9.6 G/DL (ref 12–18)
LYMPHOCYTES # BLD AUTO: 1 K/UL (ref 2–11.5)
LYMPHOCYTES NFR BLD AUTO: 7.9 % (ref 20.5–51.1)
MAGNESIUM SERPL-MCNC: 1.9 MG/DL (ref 1.8–2.4)
MCH RBC QN AUTO: 29 PG (ref 27–31)
MCHC RBC AUTO-ENTMCNC: 32 G/DL (ref 33–37)
MCV RBC AUTO: 90.3 FL (ref 80–94)
MONOCYTES # BLD AUTO: 1.2 K/UL (ref 0.8–1)
MONOCYTES NFR BLD AUTO: 9.4 % (ref 1.7–9.3)
NEUTROPHILS # BLD AUTO: 10 K/UL (ref 1.8–7.7)
NEUTROPHILS NFR BLD AUTO: 82.2 % (ref 42.2–75.2)
NEUTROPHILS NFR FLD MANUAL: 20 %
PHOSPHATE SERPL-MCNC: 3.1 MG/DL (ref 2.5–4.9)
PLATELET # BLD AUTO: 96 K/UL (ref 140–450)
POTASSIUM SERPL-SCNC: 3.1 MMOL/L (ref 3.5–5.1)
RBC # BLD AUTO: 3.33 MIL/UL (ref 4.2–6.1)
RBC # FLD MANUAL: 91 /CU. MM.
SODIUM SERPL-SCNC: 135 MMOL/L (ref 136–145)
SPECIMEN VOL FLD: 3400 ML
T4 SERPL-MCNC: 2.1 UG/DL (ref 4.5–12)
WBC # BLD AUTO: 12.2 K/UL (ref 4.8–10.8)
WBC # FLD MANUAL: 59 /CU. MM.

## 2019-12-21 PROCEDURE — 0W9G3ZZ DRAINAGE OF PERITONEAL CAVITY, PERCUTANEOUS APPROACH: ICD-10-PCS | Performed by: INTERNAL MEDICINE

## 2019-12-21 RX ADMIN — DEXTROSE SCH MLS/HR: 5 SOLUTION INTRAVENOUS at 11:16

## 2019-12-21 RX ADMIN — Medication SCH DEV: at 16:47

## 2019-12-21 RX ADMIN — MIDODRINE HYDROCHLORIDE SCH MG: 5 TABLET ORAL at 12:31

## 2019-12-21 RX ADMIN — HYDROCORTISONE SODIUM SUCCINATE SCH MG: 100 INJECTION, POWDER, FOR SOLUTION INTRAMUSCULAR; INTRAVENOUS at 22:46

## 2019-12-21 RX ADMIN — HYDROCORTISONE SODIUM SUCCINATE SCH MG: 100 INJECTION, POWDER, FOR SOLUTION INTRAMUSCULAR; INTRAVENOUS at 04:43

## 2019-12-21 RX ADMIN — MULTIVITAMIN TABLET SCH TAB: TABLET at 09:48

## 2019-12-21 RX ADMIN — Medication SCH EACH: at 09:48

## 2019-12-21 RX ADMIN — DEXTROSE SCH MLS/HR: 50 INJECTION, SOLUTION INTRAVENOUS at 18:06

## 2019-12-21 RX ADMIN — METRONIDAZOLE SCH MLS/HR: 500 SOLUTION INTRAVENOUS at 04:40

## 2019-12-21 RX ADMIN — METRONIDAZOLE SCH MLS/HR: 500 SOLUTION INTRAVENOUS at 12:31

## 2019-12-21 RX ADMIN — INSULIN LISPRO PRN UNITS: 100 INJECTION, SOLUTION INTRAVENOUS; SUBCUTANEOUS at 16:48

## 2019-12-21 RX ADMIN — Medication SCH DEV: at 22:00

## 2019-12-21 RX ADMIN — HYDROCODONE BITARTRATE AND ACETAMINOPHEN PRN TAB: 5; 325 TABLET ORAL at 04:51

## 2019-12-21 RX ADMIN — METRONIDAZOLE SCH MLS/HR: 500 SOLUTION INTRAVENOUS at 22:45

## 2019-12-21 RX ADMIN — INSULIN LISPRO PRN UNITS: 100 INJECTION, SOLUTION INTRAVENOUS; SUBCUTANEOUS at 11:35

## 2019-12-21 RX ADMIN — HYDROCODONE BITARTRATE AND ACETAMINOPHEN PRN TAB: 5; 325 TABLET ORAL at 22:23

## 2019-12-21 RX ADMIN — Medication SCH DEV: at 08:05

## 2019-12-21 RX ADMIN — INSULIN GLARGINE SCH UNITS: 100 INJECTION, SOLUTION SUBCUTANEOUS at 23:01

## 2019-12-21 RX ADMIN — Medication SCH DEV: at 11:35

## 2019-12-21 RX ADMIN — MIDODRINE HYDROCHLORIDE SCH MG: 5 TABLET ORAL at 06:27

## 2019-12-21 RX ADMIN — DEXTROSE SCH MLS/HR: 50 INJECTION, SOLUTION INTRAVENOUS at 05:45

## 2019-12-21 RX ADMIN — DEXTROSE SCH MLS/HR: 50 INJECTION, SOLUTION INTRAVENOUS at 12:03

## 2019-12-21 RX ADMIN — HYDROCORTISONE SODIUM SUCCINATE SCH MG: 100 INJECTION, POWDER, FOR SOLUTION INTRAMUSCULAR; INTRAVENOUS at 12:31

## 2019-12-21 RX ADMIN — LEVOTHYROXINE SODIUM SCH MG: 50 TABLET ORAL at 06:25

## 2019-12-21 RX ADMIN — Medication SCH TAB: at 09:48

## 2019-12-21 RX ADMIN — MIDODRINE HYDROCHLORIDE SCH MG: 5 TABLET ORAL at 18:06

## 2019-12-21 RX ADMIN — SODIUM CHLORIDE PRN MLS/HR: 0.9 INJECTION, SOLUTION INTRAVENOUS at 01:34

## 2019-12-21 NOTE — NUR
DR. LARA IS HERE TO SEE AND EXAMINE PATIENT, UPDATED ON PATIENT'S CONDITION. WILL FOLLOW UP 
ON ANY ORDERS

## 2019-12-21 NOTE — NUR
PARACENTESIS COMPLETE, PATIENT HAD 3,200 ML OF DRAINAGE. PARACENTESIS SITE CLEANED WITH 
CHLORHEXADINE AND GAUZE APPLIED TO THE SITE. PATIENT TOLERATED WELL.

## 2019-12-21 NOTE — NUR
PATIENT HAS BEEN AWAKE THE WHOLE NIGHT.COMPLAINED OF GENERALIZED DISCOMFOR AND PATIENT 
REQUESTED TO HAVE HIS PAIN PILL/PATIENT REMAIN ON NEOSYNEPHRINE AT 50 MCG,REMAIN SINUS 
RYTHM.

## 2019-12-21 NOTE — NUR
PATIENT HAS BEEN MEDICATED WITH 1 TAB OF NORCO  FOR PAIN OF HIS BACK FEET AND ABDOMEN MORE 
GENERALIZED ACCORDING TO HIM,DESCRIBED PAIN AS SCALE OF 7.PATIENT REMAIN AWAKE WHOLE NIGHT.

DRESSING ON LEFT UPPER ARM PICC LINE  STILL WITH OLD DRESSING WHEN IT WAS STARTED DUE TO THE 
POSSIBILITY THAT IT CAN COME OUT.

## 2019-12-21 NOTE — NUR
DR. FAYE IS HERE TO SEE AND EXAMINE PATIENT, UPDATED ON PATIENT'S CONDITION. HE IS AT 
BEDSIDE TO DO PARACENTESIS ON PATIENT, NO SIGNS OF DISTRESS NOTED, CONSENT OBTAINED.

## 2019-12-22 VITALS — SYSTOLIC BLOOD PRESSURE: 95 MMHG | DIASTOLIC BLOOD PRESSURE: 60 MMHG

## 2019-12-22 VITALS — DIASTOLIC BLOOD PRESSURE: 52 MMHG | SYSTOLIC BLOOD PRESSURE: 92 MMHG

## 2019-12-22 VITALS — SYSTOLIC BLOOD PRESSURE: 94 MMHG | DIASTOLIC BLOOD PRESSURE: 57 MMHG

## 2019-12-22 VITALS — SYSTOLIC BLOOD PRESSURE: 96 MMHG | DIASTOLIC BLOOD PRESSURE: 54 MMHG

## 2019-12-22 VITALS — DIASTOLIC BLOOD PRESSURE: 66 MMHG | SYSTOLIC BLOOD PRESSURE: 96 MMHG

## 2019-12-22 VITALS — SYSTOLIC BLOOD PRESSURE: 95 MMHG | DIASTOLIC BLOOD PRESSURE: 53 MMHG

## 2019-12-22 VITALS — DIASTOLIC BLOOD PRESSURE: 54 MMHG | SYSTOLIC BLOOD PRESSURE: 86 MMHG

## 2019-12-22 VITALS — SYSTOLIC BLOOD PRESSURE: 99 MMHG | DIASTOLIC BLOOD PRESSURE: 50 MMHG

## 2019-12-22 VITALS — SYSTOLIC BLOOD PRESSURE: 114 MMHG | DIASTOLIC BLOOD PRESSURE: 59 MMHG

## 2019-12-22 VITALS — SYSTOLIC BLOOD PRESSURE: 101 MMHG | DIASTOLIC BLOOD PRESSURE: 54 MMHG

## 2019-12-22 VITALS — DIASTOLIC BLOOD PRESSURE: 59 MMHG | SYSTOLIC BLOOD PRESSURE: 89 MMHG

## 2019-12-22 VITALS — DIASTOLIC BLOOD PRESSURE: 64 MMHG | SYSTOLIC BLOOD PRESSURE: 106 MMHG

## 2019-12-22 VITALS — DIASTOLIC BLOOD PRESSURE: 38 MMHG | SYSTOLIC BLOOD PRESSURE: 123 MMHG

## 2019-12-22 VITALS — DIASTOLIC BLOOD PRESSURE: 56 MMHG | SYSTOLIC BLOOD PRESSURE: 93 MMHG

## 2019-12-22 VITALS — SYSTOLIC BLOOD PRESSURE: 101 MMHG | DIASTOLIC BLOOD PRESSURE: 59 MMHG

## 2019-12-22 LAB
ANION GAP SERPL CALCULATED.3IONS-SCNC: 13.6 MMOL/L (ref 8–16)
BASOPHILS # BLD AUTO: 0 K/UL (ref 0–0.22)
BASOPHILS NFR BLD AUTO: 0.1 % (ref 0–2)
BUN SERPL-MCNC: 20 MG/DL (ref 7–18)
CHLORIDE SERPL-SCNC: 102 MMOL/L (ref 98–107)
CO2 SERPL-SCNC: 22.7 MMOL/L (ref 21–32)
CREAT SERPL-MCNC: 4 MG/DL (ref 0.7–1.3)
EOSINOPHIL # BLD AUTO: 0 K/UL (ref 0–0.4)
EOSINOPHIL NFR BLD AUTO: 0 % (ref 0–4)
ERYTHROCYTE [DISTWIDTH] IN BLOOD BY AUTOMATED COUNT: 19.8 % (ref 11.6–13.7)
GFR SERPL CREATININE-BSD FRML MDRD: 20 ML/MIN (ref 90–?)
GLUCOSE SERPL-MCNC: 126 MG/DL (ref 74–106)
HCT VFR BLD AUTO: 30.4 % (ref 36–52)
HGB BLD-MCNC: 9.7 G/DL (ref 12–18)
LYMPHOCYTES # BLD AUTO: 1 K/UL (ref 2–11.5)
LYMPHOCYTES NFR BLD AUTO: 9.5 % (ref 20.5–51.1)
MAGNESIUM SERPL-MCNC: 1.9 MG/DL (ref 1.8–2.4)
MCH RBC QN AUTO: 29 PG (ref 27–31)
MCHC RBC AUTO-ENTMCNC: 32 G/DL (ref 33–37)
MCV RBC AUTO: 89.9 FL (ref 80–94)
MONOCYTES # BLD AUTO: 1 K/UL (ref 0.8–1)
MONOCYTES NFR BLD AUTO: 9.5 % (ref 1.7–9.3)
NEUTROPHILS # BLD AUTO: 8.5 K/UL (ref 1.8–7.7)
NEUTROPHILS NFR BLD AUTO: 80.9 % (ref 42.2–75.2)
PHOSPHATE SERPL-MCNC: 4 MG/DL (ref 2.5–4.9)
PLATELET # BLD AUTO: 92 K/UL (ref 140–450)
POTASSIUM SERPL-SCNC: 3.3 MMOL/L (ref 3.5–5.1)
RBC # BLD AUTO: 3.39 MIL/UL (ref 4.2–6.1)
SODIUM SERPL-SCNC: 135 MMOL/L (ref 136–145)
WBC # BLD AUTO: 10.5 K/UL (ref 4.8–10.8)

## 2019-12-22 PROCEDURE — 5A1D70Z PERFORMANCE OF URINARY FILTRATION, INTERMITTENT, LESS THAN 6 HOURS PER DAY: ICD-10-PCS

## 2019-12-22 RX ADMIN — INSULIN GLARGINE SCH UNITS: 100 INJECTION, SOLUTION SUBCUTANEOUS at 21:00

## 2019-12-22 RX ADMIN — MIDODRINE HYDROCHLORIDE SCH MG: 5 TABLET ORAL at 13:45

## 2019-12-22 RX ADMIN — DEXTROSE SCH MLS/HR: 50 INJECTION, SOLUTION INTRAVENOUS at 00:24

## 2019-12-22 RX ADMIN — DEXTROSE SCH MLS/HR: 50 INJECTION, SOLUTION INTRAVENOUS at 23:47

## 2019-12-22 RX ADMIN — HYDROCORTISONE SODIUM SUCCINATE SCH MG: 100 INJECTION, POWDER, FOR SOLUTION INTRAMUSCULAR; INTRAVENOUS at 20:38

## 2019-12-22 RX ADMIN — Medication SCH DEV: at 16:21

## 2019-12-22 RX ADMIN — MIDODRINE HYDROCHLORIDE SCH MG: 5 TABLET ORAL at 07:04

## 2019-12-22 RX ADMIN — HYDROCODONE BITARTRATE AND ACETAMINOPHEN PRN TAB: 5; 325 TABLET ORAL at 20:39

## 2019-12-22 RX ADMIN — Medication SCH TAB: at 09:23

## 2019-12-22 RX ADMIN — Medication SCH DEV: at 07:41

## 2019-12-22 RX ADMIN — METRONIDAZOLE SCH MLS/HR: 500 SOLUTION INTRAVENOUS at 13:45

## 2019-12-22 RX ADMIN — Medication SCH DEV: at 21:14

## 2019-12-22 RX ADMIN — MIDODRINE HYDROCHLORIDE SCH MG: 5 TABLET ORAL at 19:25

## 2019-12-22 RX ADMIN — METRONIDAZOLE SCH MLS/HR: 500 SOLUTION INTRAVENOUS at 05:00

## 2019-12-22 RX ADMIN — DEXTROSE SCH MLS/HR: 50 INJECTION, SOLUTION INTRAVENOUS at 12:00

## 2019-12-22 RX ADMIN — Medication SCH EACH: at 09:24

## 2019-12-22 RX ADMIN — Medication SCH DEV: at 11:33

## 2019-12-22 RX ADMIN — HYDROCORTISONE SODIUM SUCCINATE SCH MG: 100 INJECTION, POWDER, FOR SOLUTION INTRAMUSCULAR; INTRAVENOUS at 09:23

## 2019-12-22 RX ADMIN — LACTULOSE SCH GM: 10 SOLUTION ORAL at 09:24

## 2019-12-22 RX ADMIN — DEXTROSE SCH MLS/HR: 50 INJECTION, SOLUTION INTRAVENOUS at 06:00

## 2019-12-22 RX ADMIN — METRONIDAZOLE SCH MLS/HR: 500 SOLUTION INTRAVENOUS at 20:39

## 2019-12-22 RX ADMIN — DEXTROSE SCH MLS/HR: 50 INJECTION, SOLUTION INTRAVENOUS at 18:47

## 2019-12-22 RX ADMIN — MULTIVITAMIN TABLET SCH TAB: TABLET at 09:23

## 2019-12-22 NOTE — NUR
RECEIVED BEDSIDE REPORT FROM WENDY NIGHT SHIFT RN, FOR CONTINUITY OF CARE. PATIENT IS AAOX4. 
PATIENT SKIN IS WARM, DRY, AFEBRILE, HAS SKIN TEAR TO LEFT FOREARM. PATIENT HAS PERIPHERAL 
IV SITE TO L. HAND 22 GAUGE, AND MIDLINE TO ZAYRA. BOTH ASYMPTOMATIC AND PATENT. PATIENT IS ON 
ROOM AIR, SR ON MONITOR, VITAL STABLE, DENIES ANY PAIN. NO SIGNS OF DISTRESS NOTED, CALL 
LIGHT WITHIN REACH. WILL CONTINUE TO MONITOR

## 2019-12-22 NOTE — NUR
THE PTS BLOOD PRESSURE HELD UP THROUGHOUT THE NIGHT ON MIDODRINE.  I SPOKE TO DR MORA WHO 
WILL SEE IF IT IS TIME TO GO THE FLOOR.  I ALSO TOLD HIM THE PT SHOULD TRY AND START WALKING 
AGAIN.  LABS WERE DRAWN.

THE PT SLEPT WELL AND IS A/O TIMES FOR WITHOUT C/O,

## 2019-12-22 NOTE — NUR
ADMINISTERED MEDICATION. PT TOLERATED WELL. DRESSINGS CHANGED. LEFT HAND PERIPHERAL IV 
DISCONTINUED.

## 2019-12-22 NOTE — NUR
RECEIVED REPORT FROM AM NURSE. PT AT BED AWAKE, PERRL 3MM, BRISK, ALERT AND ORIENTED X4, 
CALM, COOPERATIVE, HEART RATE REGULAR, S1S2, CAP REFILL <3S, SR ON MONITOR, PULSES 2+ 
BILATERAL UPPER AND LOWER EXTREMITIES, LUNG SOUNDS CLEAR THROUGHOUT, BREATHING REGULARLY, 
ABDOMEN, SOFT, ROUND, NONDISTENDED, NONTENDER, BLADDER, SOFT, ROUND, NONTENDER, 
NONDISTENDED, PT HAS GENERALIZED WEAKNESS, PT HAS LEFT HAND 22 GAUGE, RUNNING NS AT 5 ML/HR, 
PT HAS LEFT UPPER ARM MIDLINE, PT SKIN NON INTACT, BILATERAL SKIN DISCOLORATION, SKIN, WARM, 
DRY, NON INTACT, LEFT UPPER ARM DRY WOUND, KERLIX DRESSING IN PLACE, DRY AND INTACT. HOB 30 
DEGREES, SIDE RAILS UP X2, BED AT LOWEST POSITION.  

-------------------------------------------------------------------------------

Addendum: 12/22/19 at 2226 by Jayson Morales RN

-------------------------------------------------------------------------------

PT ON ROOM AIR

## 2019-12-23 VITALS — SYSTOLIC BLOOD PRESSURE: 107 MMHG | DIASTOLIC BLOOD PRESSURE: 69 MMHG

## 2019-12-23 VITALS — SYSTOLIC BLOOD PRESSURE: 105 MMHG | DIASTOLIC BLOOD PRESSURE: 67 MMHG

## 2019-12-23 VITALS — DIASTOLIC BLOOD PRESSURE: 74 MMHG | SYSTOLIC BLOOD PRESSURE: 137 MMHG

## 2019-12-23 VITALS — DIASTOLIC BLOOD PRESSURE: 67 MMHG | SYSTOLIC BLOOD PRESSURE: 97 MMHG

## 2019-12-23 VITALS — SYSTOLIC BLOOD PRESSURE: 99 MMHG | DIASTOLIC BLOOD PRESSURE: 64 MMHG

## 2019-12-23 VITALS — SYSTOLIC BLOOD PRESSURE: 118 MMHG | DIASTOLIC BLOOD PRESSURE: 65 MMHG

## 2019-12-23 PROCEDURE — 5A1D70Z PERFORMANCE OF URINARY FILTRATION, INTERMITTENT, LESS THAN 6 HOURS PER DAY: ICD-10-PCS

## 2019-12-23 RX ADMIN — HYDROCORTISONE SODIUM SUCCINATE SCH MG: 100 INJECTION, POWDER, FOR SOLUTION INTRAMUSCULAR; INTRAVENOUS at 21:00

## 2019-12-23 RX ADMIN — INSULIN GLARGINE SCH UNITS: 100 INJECTION, SOLUTION SUBCUTANEOUS at 20:57

## 2019-12-23 RX ADMIN — Medication SCH DEV: at 05:50

## 2019-12-23 RX ADMIN — Medication SCH DEV: at 12:20

## 2019-12-23 RX ADMIN — Medication SCH DEV: at 21:00

## 2019-12-23 RX ADMIN — HYDROCORTISONE SODIUM SUCCINATE SCH MG: 100 INJECTION, POWDER, FOR SOLUTION INTRAMUSCULAR; INTRAVENOUS at 10:49

## 2019-12-23 RX ADMIN — Medication SCH TAB: at 10:48

## 2019-12-23 RX ADMIN — Medication SCH DEV: at 16:30

## 2019-12-23 RX ADMIN — HYDROCODONE BITARTRATE AND ACETAMINOPHEN PRN TAB: 5; 325 TABLET ORAL at 10:47

## 2019-12-23 RX ADMIN — METRONIDAZOLE SCH MLS/HR: 500 SOLUTION INTRAVENOUS at 04:34

## 2019-12-23 RX ADMIN — DEXTROSE SCH MLS/HR: 50 INJECTION, SOLUTION INTRAVENOUS at 05:54

## 2019-12-23 RX ADMIN — MULTIVITAMIN TABLET SCH TAB: TABLET at 10:48

## 2019-12-23 RX ADMIN — MIDODRINE HYDROCHLORIDE SCH MG: 5 TABLET ORAL at 19:00

## 2019-12-23 RX ADMIN — MIDODRINE HYDROCHLORIDE SCH MG: 5 TABLET ORAL at 07:06

## 2019-12-23 RX ADMIN — LACTULOSE SCH GM: 10 SOLUTION ORAL at 10:49

## 2019-12-23 RX ADMIN — Medication SCH EACH: at 10:48

## 2019-12-23 RX ADMIN — MIDODRINE HYDROCHLORIDE SCH MG: 5 TABLET ORAL at 14:38

## 2019-12-23 NOTE — NUR
COMPANY SUBCONTRACTED WITH "[employer] CAR", [employer], AS PER RUBÉN, THEY WILL BE HERE AT 
MIDNIGHT. PT MADE AWARE.

## 2019-12-23 NOTE — NUR
PT FINGERSTICK , NO HUMALOG COVERAGE NEEDED. MIDODRINE WAS HELD DUE TO ELEVATED B/P. 
SOLU -CORTEF NOT GIVEN DUE TO NO IV ACCESS AT THIS TIME, PT HAS NO SOB NOTED, PT WAS GIVEN 
ORDERED SEROQUEL AND LANTUS AND HAD A [employer] SANDWICH FOR A SNACK. WILL FOLLOW UP ON 
ETA FOR TRANSPORT.

## 2019-12-23 NOTE — NUR
PT'S L ARM MIDLINE HAS INFILTRATED, THE PT'S ARM IS SWOLLEN AND BRUISED AT THE AREA OF THE 
MIDLINE. DR NAYLOR MADE AWARE, AND WILL CANCEL PT'S IV MEDS. PT IS PLANNING TO BE TRANSFERRED 
 BACK TO [facility] TODAY.

## 2019-12-23 NOTE — NUR
PLAN OF CARE ENDORSED AT BEDSIDE FORM ABAD RN DAYSHIFT NURSE AT BEDSIDE FOR CONTINUITY OF 
CARE, PT IN STABLE CONDITION.

## 2019-12-23 NOTE — NUR
CALLED "[employer]" TRANSPORT [phone] EX. [phone] TO FOLLOW UP ON PT'S RIDE, RIDE 
COORDINATOR KIRSTEN SAID THAT SOMEONE IS WORKING ON SCHEDULING A RIDE FOR THE PT, AND WILL GIVE 
US A CALL BACK IN 10 MINS.

## 2019-12-23 NOTE — NUR
RECEIVED PT FROM ICU.PT CONDITION IS STABLE.NO RESP DISTRESS NOTED.TELE APPLIED AND SHOWING 
SR.VS STABLE.BP IS A LITTLE BIT LOW.CALL LIGHT WITHIN REACH.WILL BRUSES ON LT ARM.DRESSING 
IN PLACE. LT UPPER ARM MID LINE INTACT.WILL CONTINUE MONITORING.

## 2019-12-23 NOTE — NUR
CALLED [facility] TO ARRANGE TRANSPORT FOR PT TRANSFER [place] [facility] [place]. AUTH # [id number]. PT MED REC # 
[id number]. RES # [id number].  SAID SOMEONE WILL CALL US BACK WITH AN ESTIMATED ETA.

## 2019-12-23 NOTE — NUR
ENDORSED PT TO NIGHT SHIFT NURSE IN STABLE CONDITION. NO CALL RECEIVED YET FROM 
TRANSPORTATION COMPANY.

## 2019-12-23 NOTE — NUR
PT IN LOW BED ALL FALLS PRECAUTIONS IN PLACE. PT IS AOX4. HE HAS A RIGHT SIDED DIALYSIS 
CATHETER ON R UPPER CHEST. PT STATERS THAT HE ALSO HAD AN OLD AV SHUNT IN LEFT ARM. HE IS 
AOX4 DRESSED AND WITH BELONGINGS PACKED AT BEDSIDE. V/S AS FOLLOWS: T 97.2 P 88 R 18 B/P 
137/74 02 98% ON ROOM AIR, PT HAS NO C/O OF PAIN AT THIS TIME.

## 2019-12-24 NOTE — NUR
TRANSP[facility] HERE UNIT 123. 2 EMT'S HERE GIVEN BRIEF REPORT REGARDING PT AND HE WAS 
TRANSFERRED TO [facility] TO GO BACK TO [facility]. PT LEFT IN STABLE CONDITION WITH BELONGINGS IN 
HAND.